# Patient Record
Sex: FEMALE | Race: WHITE | Employment: FULL TIME | URBAN - METROPOLITAN AREA
[De-identification: names, ages, dates, MRNs, and addresses within clinical notes are randomized per-mention and may not be internally consistent; named-entity substitution may affect disease eponyms.]

---

## 2017-05-09 ENCOUNTER — HOSPITAL ENCOUNTER (OUTPATIENT)
Dept: RESPIRATORY THERAPY | Age: 22
Discharge: HOME | End: 2017-05-09
Attending: INTERNAL MEDICINE
Payer: COMMERCIAL

## 2017-05-09 ENCOUNTER — HOSPITAL ENCOUNTER (OUTPATIENT)
Dept: NON INVASIVE DIAGNOSTICS | Age: 22
Discharge: HOME | End: 2017-05-09
Attending: INTERNAL MEDICINE
Payer: COMMERCIAL

## 2017-05-09 DIAGNOSIS — R06.02 SOB (SHORTNESS OF BREATH): ICD-10-CM

## 2017-05-09 PROCEDURE — 94729 DIFFUSING CAPACITY: CPT

## 2017-05-09 PROCEDURE — 94726 PLETHYSMOGRAPHY LUNG VOLUMES: CPT

## 2017-05-09 PROCEDURE — 94060 EVALUATION OF WHEEZING: CPT

## 2017-05-09 PROCEDURE — 93306 TTE W/DOPPLER COMPLETE: CPT

## 2018-07-16 ENCOUNTER — HOSPITAL ENCOUNTER (OUTPATIENT)
Dept: GENERAL RADIOLOGY | Age: 23
Discharge: HOME | End: 2018-07-16
Payer: COMMERCIAL

## 2018-07-16 DIAGNOSIS — M35.89 OTHER SPECIFIED SYSTEMIC INVOLVEMENT OF CONNECTIVE TISSUE (HCC): ICD-10-CM

## 2018-07-16 PROCEDURE — 71046 X-RAY EXAM CHEST 2 VIEWS: CPT

## 2018-12-14 ENCOUNTER — HOSPITAL ENCOUNTER (OUTPATIENT)
Dept: NON INVASIVE DIAGNOSTICS | Age: 23
Discharge: HOME | End: 2018-12-14
Attending: INTERNAL MEDICINE
Payer: COMMERCIAL

## 2018-12-14 ENCOUNTER — HOSPITAL ENCOUNTER (OUTPATIENT)
Dept: RESPIRATORY THERAPY | Age: 23
Discharge: HOME | End: 2018-12-14
Attending: INTERNAL MEDICINE
Payer: COMMERCIAL

## 2018-12-14 VITALS
DIASTOLIC BLOOD PRESSURE: 70 MMHG | BODY MASS INDEX: 21.9 KG/M2 | HEIGHT: 61 IN | SYSTOLIC BLOOD PRESSURE: 112 MMHG | WEIGHT: 116 LBS

## 2018-12-14 DIAGNOSIS — R06.02 SOB (SHORTNESS OF BREATH): ICD-10-CM

## 2018-12-14 LAB
ECHO AO ROOT DIAM: 2.82 CM
ECHO LA AREA 4C: 8.5 CM2
ECHO LA VOL 2C: 20.12 ML (ref 22–52)
ECHO LA VOL 4C: 12.43 ML (ref 22–52)
ECHO LA VOL BP: 18.29 ML (ref 22–52)
ECHO LA VOL/BSA BIPLANE: 12.2 ML/M2 (ref 16–28)
ECHO LA VOLUME INDEX A2C: 13.43 ML/M2 (ref 16–28)
ECHO LA VOLUME INDEX A4C: 8.29 ML/M2 (ref 16–28)
ECHO LV INTERNAL DIMENSION DIASTOLIC: 3.81 CM (ref 3.9–5.3)
ECHO LV INTERNAL DIMENSION SYSTOLIC: 2.58 CM
ECHO LV IVSD: 0.61 CM (ref 0.6–0.9)
ECHO LV MASS 2D: 75.2 G (ref 67–162)
ECHO LV MASS INDEX 2D: 50.2 G/M2 (ref 43–95)
ECHO LV POSTERIOR WALL DIASTOLIC: 0.79 CM (ref 0.6–0.9)
ECHO LVOT DIAM: 2.01 CM
ECHO LVOT PEAK GRADIENT: 2.5 MMHG
ECHO LVOT PEAK VELOCITY: 79.57 CM/S
ECHO LVOT VTI: 15.49 CM
ECHO MV A VELOCITY: 45.74 CM/S
ECHO MV E DECELERATION TIME (DT): 148.6 MS
ECHO MV E VELOCITY: 0.78 CM/S
ECHO MV E/A RATIO: 0.02
ECHO PULMONARY ARTERY SYSTOLIC PRESSURE (PASP): 19 MMHG
ECHO TV REGURGITANT MAX VELOCITY: 201.35 CM/S
ECHO TV REGURGITANT PEAK GRADIENT: 16.2 MMHG

## 2018-12-14 PROCEDURE — 94729 DIFFUSING CAPACITY: CPT

## 2018-12-14 PROCEDURE — 94726 PLETHYSMOGRAPHY LUNG VOLUMES: CPT

## 2018-12-14 PROCEDURE — 94060 EVALUATION OF WHEEZING: CPT

## 2018-12-14 PROCEDURE — 93306 TTE W/DOPPLER COMPLETE: CPT

## 2019-01-04 ENCOUNTER — HOSPITAL ENCOUNTER (OUTPATIENT)
Dept: GENERAL RADIOLOGY | Age: 24
Discharge: HOME | End: 2019-01-04
Payer: COMMERCIAL

## 2019-01-04 DIAGNOSIS — R06.09 OTHER FORMS OF DYSPNEA: ICD-10-CM

## 2019-01-04 PROCEDURE — 71046 X-RAY EXAM CHEST 2 VIEWS: CPT

## 2020-07-16 ENCOUNTER — HOSPITAL ENCOUNTER (OUTPATIENT)
Dept: NON INVASIVE DIAGNOSTICS | Age: 25
Discharge: HOME | End: 2020-07-16
Attending: INTERNAL MEDICINE
Payer: COMMERCIAL

## 2020-07-16 VITALS
HEIGHT: 61 IN | WEIGHT: 116 LBS | BODY MASS INDEX: 21.9 KG/M2 | DIASTOLIC BLOOD PRESSURE: 70 MMHG | SYSTOLIC BLOOD PRESSURE: 98 MMHG

## 2020-07-16 DIAGNOSIS — M35.89 OTHER SPECIFIED SYSTEMIC INVOLVEMENT OF CONNECTIVE TISSUE (HCC): ICD-10-CM

## 2020-07-16 LAB
ECHO AO ROOT DIAM: 3.07 CM
ECHO LV E' LATERAL VELOCITY: 12.55 CM/S
ECHO LV E' SEPTAL VELOCITY: 10.22 CM/S
ECHO LV INTERNAL DIMENSION DIASTOLIC: 3.94 CM (ref 3.9–5.3)
ECHO LV INTERNAL DIMENSION SYSTOLIC: 2.2 CM
ECHO LV IVSD: 0.66 CM (ref 0.6–0.9)
ECHO LV MASS 2D: 82.9 G (ref 67–162)
ECHO LV MASS INDEX 2D: 55.3 G/M2 (ref 43–95)
ECHO LV POSTERIOR WALL DIASTOLIC: 0.83 CM (ref 0.6–0.9)
ECHO LVOT CARDIAC OUTPUT: 3.17 LITER/MINUTE
ECHO LVOT DIAM: 1.99 CM
ECHO LVOT PEAK GRADIENT: 2.15 MMHG
ECHO LVOT PEAK VELOCITY: 73.3 CM/S
ECHO LVOT SV: 42.8 ML
ECHO LVOT VTI: 13.78 CM
ECHO MV A VELOCITY: 58.66 CM/S
ECHO MV E DECELERATION TIME (DT): 0.13 S
ECHO MV E VELOCITY: 80.36 CM/S
ECHO MV E/A RATIO: 1.37
ECHO MV E/E' LATERAL: 6.4
ECHO MV E/E' RATIO (AVERAGED): 7.13
ECHO MV E/E' SEPTAL: 7.86
ECHO RV TAPSE: 1.31 CM (ref 1.5–2)
ECHO TV REGURGITANT MAX VELOCITY: 228.39 CM/S
ECHO TV REGURGITANT PEAK GRADIENT: 20.86 MMHG
LVOT MG: 1.05 MMHG

## 2020-07-16 PROCEDURE — 93306 TTE W/DOPPLER COMPLETE: CPT

## 2021-03-08 ENCOUNTER — TRANSCRIBE ORDER (OUTPATIENT)
Dept: SCHEDULING | Age: 26
End: 2021-03-08

## 2021-03-08 DIAGNOSIS — R06.09 DOE (DYSPNEA ON EXERTION): Primary | ICD-10-CM

## 2021-04-14 ENCOUNTER — HOSPITAL ENCOUNTER (INPATIENT)
Age: 26
LOS: 5 days | Discharge: HOME | DRG: 885 | End: 2021-04-19
Attending: EMERGENCY MEDICINE | Admitting: PSYCHIATRY & NEUROLOGY
Payer: COMMERCIAL

## 2021-04-14 DIAGNOSIS — R45.851 SUICIDAL IDEATION: Primary | ICD-10-CM

## 2021-04-14 PROBLEM — F32.A DEPRESSION: Status: ACTIVE | Noted: 2021-04-14

## 2021-04-14 LAB
AMPHET UR QL SCN: NEGATIVE
ANION GAP SERPL CALC-SCNC: 6 MMOL/L (ref 3–18)
BARBITURATES UR QL SCN: NEGATIVE
BASOPHILS # BLD: 0.1 K/UL (ref 0–0.1)
BASOPHILS NFR BLD: 1 % (ref 0–2)
BENZODIAZ UR QL: POSITIVE
BUN SERPL-MCNC: 10 MG/DL (ref 7–18)
BUN/CREAT SERPL: 14 (ref 12–20)
CALCIUM SERPL-MCNC: 9.8 MG/DL (ref 8.5–10.1)
CANNABINOIDS UR QL SCN: POSITIVE
CHLORIDE SERPL-SCNC: 105 MMOL/L (ref 100–111)
CO2 SERPL-SCNC: 25 MMOL/L (ref 21–32)
COCAINE UR QL SCN: NEGATIVE
COVID-19 RAPID TEST, COVR: NOT DETECTED
CREAT SERPL-MCNC: 0.72 MG/DL (ref 0.6–1.3)
DIFFERENTIAL METHOD BLD: ABNORMAL
EOSINOPHIL # BLD: 0.1 K/UL (ref 0–0.4)
EOSINOPHIL NFR BLD: 1 % (ref 0–5)
ERYTHROCYTE [DISTWIDTH] IN BLOOD BY AUTOMATED COUNT: 12.1 % (ref 11.6–14.5)
ETHANOL SERPL-MCNC: <3 MG/DL (ref 0–3)
GLUCOSE SERPL-MCNC: 84 MG/DL (ref 74–99)
HCG SERPL QL: NEGATIVE
HCT VFR BLD AUTO: 43.4 % (ref 35–45)
HDSCOM,HDSCOM: ABNORMAL
HGB BLD-MCNC: 14.6 G/DL (ref 12–16)
LYMPHOCYTES # BLD: 1.9 K/UL (ref 0.9–3.6)
LYMPHOCYTES NFR BLD: 18 % (ref 21–52)
MCH RBC QN AUTO: 31.8 PG (ref 24–34)
MCHC RBC AUTO-ENTMCNC: 33.6 G/DL (ref 31–37)
MCV RBC AUTO: 94.6 FL (ref 74–97)
METHADONE UR QL: NEGATIVE
MONOCYTES # BLD: 0.6 K/UL (ref 0.05–1.2)
MONOCYTES NFR BLD: 6 % (ref 3–10)
NEUTS SEG # BLD: 7.8 K/UL (ref 1.8–8)
NEUTS SEG NFR BLD: 74 % (ref 40–73)
OPIATES UR QL: NEGATIVE
PCP UR QL: NEGATIVE
PLATELET # BLD AUTO: 342 K/UL (ref 135–420)
PMV BLD AUTO: 9.8 FL (ref 9.2–11.8)
POTASSIUM SERPL-SCNC: 3.7 MMOL/L (ref 3.5–5.5)
RBC # BLD AUTO: 4.59 M/UL (ref 4.2–5.3)
SODIUM SERPL-SCNC: 136 MMOL/L (ref 136–145)
SOURCE, COVRS: NORMAL
WBC # BLD AUTO: 10.6 K/UL (ref 4.6–13.2)

## 2021-04-14 PROCEDURE — 87635 SARS-COV-2 COVID-19 AMP PRB: CPT

## 2021-04-14 PROCEDURE — 82077 ASSAY SPEC XCP UR&BREATH IA: CPT

## 2021-04-14 PROCEDURE — 99282 EMERGENCY DEPT VISIT SF MDM: CPT

## 2021-04-14 PROCEDURE — 84703 CHORIONIC GONADOTROPIN ASSAY: CPT

## 2021-04-14 PROCEDURE — 65220000003 HC RM SEMIPRIVATE PSYCH

## 2021-04-14 PROCEDURE — 85025 COMPLETE CBC W/AUTO DIFF WBC: CPT

## 2021-04-14 PROCEDURE — 80307 DRUG TEST PRSMV CHEM ANLYZR: CPT

## 2021-04-14 PROCEDURE — 80048 BASIC METABOLIC PNL TOTAL CA: CPT

## 2021-04-14 RX ORDER — BUSPIRONE HYDROCHLORIDE 5 MG/1
TABLET ORAL
Status: CANCELLED | OUTPATIENT
Start: 2021-04-14

## 2021-04-14 NOTE — ED PROVIDER NOTES
EMERGENCY DEPARTMENT HISTORY AND PHYSICAL EXAM    5:39 PM      Date: 4/14/2021  Patient Name: Myron Pittman    History of Presenting Illness     Chief Complaint   Patient presents with    Mental Health Problem     SI with plan       History Provided By: Patient    Additional History (Context): Myron Pittman is a 31 y.o. female with hx of anxiety, depression, fatigue, and other noted PMH who presents with complaint of suicidal ideation x1 day. Patient notes her plan would be to take all of her medication. Patient notes she saw her therapist today who recommended she come to the ED for further assessment. Patient denies visual or auditory hallucinations, homicidal ideation. Denies alcohol or drug use. PCP: Douglas Villalobos MD    Current Outpatient Medications   Medication Sig Dispense Refill    doxycycline (VIBRAMYCIN) 100 mg capsule Take 1 Cap by mouth two (2) times a day. 14 Cap 0    thyroid, Pork, (ARMOUR THYROID) 90 mg tablet Take 90 mg by mouth daily.  escitalopram oxalate (LEXAPRO) 20 mg tablet       predniSONE (DELTASONE) 5 mg tablet Take  by mouth.  hydroxychloroquine (PLAQUENIL) 200 mg tablet Take 1 Tab by mouth.  0    ranitidine (ZANTAC) 300 mg tablet Take 1 Tab by mouth as needed.  Indications: Heartburn  1       Past History     Past Medical History:  Past Medical History:   Diagnosis Date    Anxiety     nos    Chronic pain     Coagulation disorder (HCC)     MTHFR mutation - inc clotting    Eosinophilic esophagitis     Fatigue     GERD     Hemangioma     removed as child    Hypothyroid     Lupus ([address]     Raynaud's disease     Restless leg syndrome     Seizures ([address]     one event with blood draw    Unspecified diffuse connective tissue disease     scleroderma variant       Past Surgical History:  Past Surgical History:   Procedure Laterality Date    HX OTHER SURGICAL      hemangioma removal back of neck    HX WISDOM TEETH EXTRACTION         Family History:  Family History   Problem Relation Age of Onset    Diabetes Mother        Social History:  Social History     Tobacco Use    Smoking status: Never Smoker    Smokeless tobacco: Never Used   Substance Use Topics    Alcohol use: Yes     Comment: occasionally    Drug use: No       Allergies: Allergies   Allergen Reactions    Nuts Damian Nicki Nut] Anaphylaxis    Egg Other (comments)     \"throat swelling\"         Review of Systems       Review of Systems   Constitutional: Negative for chills and fever. Respiratory: Negative for shortness of breath. Cardiovascular: Negative for chest pain. Gastrointestinal: Negative for abdominal pain, nausea and vomiting. Skin: Negative for rash. Neurological: Negative for weakness. Psychiatric/Behavioral: Positive for suicidal ideas. All other systems reviewed and are negative. Physical Exam     Visit Vitals  /86 (BP 1 Location: Left upper arm, BP Patient Position: At rest;Sitting)   Pulse 90   Temp 99.3 °F (37.4 °C)   Resp 18   Ht 5' 1\" (1.549 m)   Wt 52.9 kg (116 lb 9.6 oz)   SpO2 99%   BMI 22.03 kg/m²         Physical Exam  Vitals signs and nursing note reviewed. Constitutional:       General: She is not in acute distress. Appearance: She is well-developed. She is not ill-appearing, toxic-appearing or diaphoretic. HENT:      Head: Normocephalic and atraumatic. Neck:      Musculoskeletal: Normal range of motion and neck supple. Cardiovascular:      Rate and Rhythm: Normal rate and regular rhythm. Heart sounds: Normal heart sounds. No murmur. No friction rub. No gallop. Pulmonary:      Effort: Pulmonary effort is normal. No respiratory distress. Breath sounds: Normal breath sounds. No wheezing or rales. Musculoskeletal: Normal range of motion. Skin:     General: Skin is warm. Findings: No rash. Neurological:      General: No focal deficit present. Mental Status: She is alert and oriented to person, place, and time. Psychiatric:         Attention and Perception: Attention normal.         Mood and Affect: Mood normal.         Speech: Speech normal.         Behavior: Behavior normal.         Thought Content: Thought content includes suicidal ideation. Thought content does not include homicidal ideation. Thought content includes suicidal plan. Thought content does not include homicidal plan. Diagnostic Study Results     Labs -  Recent Results (from the past 12 hour(s))   DRUG SCREEN, URINE    Collection Time: 04/14/21  5:28 PM   Result Value Ref Range    BENZODIAZEPINES Positive (A) NEG      BARBITURATES Negative NEG      THC (TH-CANNABINOL) Positive (A) NEG      OPIATES Negative NEG      PCP(PHENCYCLIDINE) Negative NEG      COCAINE Negative NEG      AMPHETAMINES Negative NEG      METHADONE Negative NEG      HDSCOM (NOTE)    ETHYL ALCOHOL    Collection Time: 04/14/21  5:29 PM   Result Value Ref Range    ALCOHOL(ETHYL),SERUM <3 0 - 3 MG/DL   CBC WITH AUTOMATED DIFF    Collection Time: 04/14/21  5:29 PM   Result Value Ref Range    WBC 10.6 4.6 - 13.2 K/uL    RBC 4.59 4.20 - 5.30 M/uL    HGB 14.6 12.0 - 16.0 g/dL    HCT 43.4 35.0 - 45.0 %    MCV 94.6 74.0 - 97.0 FL    MCH 31.8 24.0 - 34.0 PG    MCHC 33.6 31.0 - 37.0 g/dL    RDW 12.1 11.6 - 14.5 %    PLATELET 062 893 - 451 K/uL    MPV 9.8 9.2 - 11.8 FL    NEUTROPHILS 74 (H) 40 - 73 %    LYMPHOCYTES 18 (L) 21 - 52 %    MONOCYTES 6 3 - 10 %    EOSINOPHILS 1 0 - 5 %    BASOPHILS 1 0 - 2 %    ABS. NEUTROPHILS 7.8 1.8 - 8.0 K/UL    ABS. LYMPHOCYTES 1.9 0.9 - 3.6 K/UL    ABS. MONOCYTES 0.6 0.05 - 1.2 K/UL    ABS. EOSINOPHILS 0.1 0.0 - 0.4 K/UL    ABS.  BASOPHILS 0.1 0.0 - 0.1 K/UL    DF AUTOMATED     METABOLIC PANEL, BASIC    Collection Time: 04/14/21  5:29 PM   Result Value Ref Range    Sodium 136 136 - 145 mmol/L    Potassium 3.7 3.5 - 5.5 mmol/L    Chloride 105 100 - 111 mmol/L    CO2 25 21 - 32 mmol/L    Anion gap 6 3.0 - 18 mmol/L    Glucose 84 74 - 99 mg/dL    BUN 10 7.0 - 18 MG/DL    Creatinine 0.72 0.6 - 1.3 MG/DL    BUN/Creatinine ratio 14 12 - 20      GFR est AA >60 >60 ml/min/1.73m2    GFR est non-AA >60 >60 ml/min/1.73m2    Calcium 9.8 8.5 - 10.1 MG/DL   HCG QL SERUM    Collection Time: 04/14/21  5:29 PM   Result Value Ref Range    HCG, Ql. Negative NEG     COVID-19 RAPID TEST    Collection Time: 04/14/21  6:38 PM   Result Value Ref Range    Specimen source Nasopharyngeal      COVID-19 rapid test Not detected NOTD         Radiologic Studies -   No orders to display         Medical Decision Making   I am the first provider for this patient. I reviewed the vital signs, available nursing notes, past medical history, past surgical history, family history and social history. Vital Signs-Reviewed the patient's vital signs. Records Reviewed: Nursing Notes and Old Medical Records (Time of Review: 5:39 PM)    ED Course: Progress Notes, Reevaluation, and Consults:  7:22 PM: Spoke with Deepak Rhoades crisis. Will be down to see patient. Updated patient and parents with plan.   9:09 PM: Spoke with Deepak Rhoades, pt will be admitted to [address] [place] [address]  MDM: 79-year-old female who presents to the ED with suicidal ideation with a plan. Medically screened in the ED, consulted crisis. Patient will be admitted to [address] [place] [address]  behavioral health for further assessment. Diagnosis     Clinical Impression:   1. Suicidal ideation        Disposition:ADMISSION    Follow-up Information    None          Patient's Medications   Start Taking    No medications on file   Continue Taking    DOXYCYCLINE (VIBRAMYCIN) 100 MG CAPSULE    Take 1 Cap by mouth two (2) times a day. ESCITALOPRAM OXALATE (LEXAPRO) 20 MG TABLET        HYDROXYCHLOROQUINE (PLAQUENIL) 200 MG TABLET    Take 1 Tab by mouth. PREDNISONE (DELTASONE) 5 MG TABLET    Take  by mouth. RANITIDINE (ZANTAC) 300 MG TABLET    Take 1 Tab by mouth as needed. Indications: Heartburn    THYROID, PORK, (ARMOUR THYROID) 90 MG TABLET    Take 90 mg by mouth daily. These Medications have changed    No medications on file   Stop Taking    No medications on file       Dictation disclaimer:  Please note that this dictation was completed with [employer], the computer voice recognition software. Quite often unanticipated grammatical, syntax, homophones, and other interpretive errors are inadvertently transcribed by the computer software. Please disregard these errors. Please excuse any errors that have escaped final proofreading.

## 2021-04-15 PROBLEM — E03.9 ACQUIRED HYPOTHYROIDISM: Chronic | Status: ACTIVE | Noted: 2021-04-15

## 2021-04-15 PROBLEM — M32.9 LUPUS (SYSTEMIC LUPUS ERYTHEMATOSUS) (HCC): Status: ACTIVE | Noted: 2021-04-15

## 2021-04-15 PROBLEM — F31.4 BIPOLAR 1 DISORDER, DEPRESSED, SEVERE (HCC): Status: ACTIVE | Noted: 2021-04-14

## 2021-04-15 PROBLEM — F12.20 CANNABIS USE DISORDER, SEVERE, DEPENDENCE (HCC): Status: ACTIVE | Noted: 2021-04-15

## 2021-04-15 PROCEDURE — 74011250637 HC RX REV CODE- 250/637: Performed by: PSYCHIATRY & NEUROLOGY

## 2021-04-15 PROCEDURE — 99223 1ST HOSP IP/OBS HIGH 75: CPT | Performed by: PSYCHIATRY & NEUROLOGY

## 2021-04-15 PROCEDURE — 65220000003 HC RM SEMIPRIVATE PSYCH

## 2021-04-15 RX ORDER — LAMOTRIGINE 25 MG/1
50 TABLET ORAL 2 TIMES DAILY
Status: DISCONTINUED | OUTPATIENT
Start: 2021-04-15 | End: 2021-04-19 | Stop reason: HOSPADM

## 2021-04-15 RX ORDER — EPINEPHRINE 0.3 MG/.3ML
0.3 INJECTION SUBCUTANEOUS
Status: DISPENSED | OUTPATIENT
Start: 2021-04-15 | End: 2021-04-16

## 2021-04-15 RX ORDER — LEVOTHYROXINE AND LIOTHYRONINE 19; 4.5 UG/1; UG/1
120 TABLET ORAL DAILY
Status: DISCONTINUED | OUTPATIENT
Start: 2021-04-16 | End: 2021-04-19 | Stop reason: HOSPADM

## 2021-04-15 RX ORDER — ALPRAZOLAM 0.5 MG/1
0.5 TABLET ORAL
COMMUNITY

## 2021-04-15 RX ORDER — BUPROPION HYDROCHLORIDE 300 MG/1
300 TABLET ORAL
Status: DISCONTINUED | OUTPATIENT
Start: 2021-04-15 | End: 2021-04-19 | Stop reason: HOSPADM

## 2021-04-15 RX ORDER — ALPRAZOLAM 0.25 MG/1
0.5 TABLET ORAL
Status: DISCONTINUED | OUTPATIENT
Start: 2021-04-15 | End: 2021-04-19 | Stop reason: HOSPADM

## 2021-04-15 RX ORDER — DIPHENHYDRAMINE HCL 25 MG
25 CAPSULE ORAL
Status: DISCONTINUED | OUTPATIENT
Start: 2021-04-15 | End: 2021-04-19 | Stop reason: HOSPADM

## 2021-04-15 RX ORDER — LAMOTRIGINE 25 MG/1
50 TABLET ORAL DAILY
Status: DISCONTINUED | OUTPATIENT
Start: 2021-04-15 | End: 2021-04-15

## 2021-04-15 RX ORDER — BUPROPION HYDROCHLORIDE 300 MG/1
300 TABLET ORAL
COMMUNITY

## 2021-04-15 RX ORDER — HYDROXYCHLOROQUINE SULFATE 200 MG/1
200 TABLET, FILM COATED ORAL
Status: DISCONTINUED | OUTPATIENT
Start: 2021-04-15 | End: 2021-04-15 | Stop reason: ALTCHOICE

## 2021-04-15 RX ORDER — HYDROXYCHLOROQUINE SULFATE 200 MG/1
200 TABLET, FILM COATED ORAL DAILY
Status: DISCONTINUED | OUTPATIENT
Start: 2021-04-16 | End: 2021-04-19 | Stop reason: HOSPADM

## 2021-04-15 RX ORDER — HYDROXYZINE PAMOATE 50 MG/1
50 CAPSULE ORAL
Status: DISCONTINUED | OUTPATIENT
Start: 2021-04-15 | End: 2021-04-19 | Stop reason: HOSPADM

## 2021-04-15 RX ORDER — TRAZODONE HYDROCHLORIDE 50 MG/1
50 TABLET ORAL
Status: DISCONTINUED | OUTPATIENT
Start: 2021-04-15 | End: 2021-04-15

## 2021-04-15 RX ORDER — LAMOTRIGINE 25 MG/1
50 TABLET ORAL DAILY
Status: ON HOLD | COMMUNITY
End: 2021-04-19 | Stop reason: SDUPTHER

## 2021-04-15 RX ADMIN — ALPRAZOLAM 0.5 MG: 0.25 TABLET ORAL at 20:38

## 2021-04-15 RX ADMIN — LAMOTRIGINE 50 MG: 25 TABLET ORAL at 20:38

## 2021-04-15 RX ADMIN — BUPROPION HYDROCHLORIDE 300 MG: 300 TABLET, FILM COATED, EXTENDED RELEASE ORAL at 08:46

## 2021-04-15 RX ADMIN — LAMOTRIGINE 50 MG: 25 TABLET ORAL at 08:44

## 2021-04-15 RX ADMIN — DIPHENHYDRAMINE HYDROCHLORIDE 25 MG: 25 CAPSULE ORAL at 13:44

## 2021-04-15 NOTE — BH NOTES
Patient was lying in bed awake upon Staff arrival on Unit this morning. Patient ate about 50% of her Breakfast and Lunch during shift today. Patient did not interact with Peers, but Patient did cooperate and interact well with Staff today during shift. Patient Attended and Participated in Group Sessions today during shift. Staff assisted Patient with her Menu choices for tomorrow's Meals. Patient requested to have a consultation with Dietician. Staff notified Dietician of Patient's request for consultation. Dietician did consult with Patient as requested during shift today. Patient did not exhibit any negative, defiant, aggressive behavior today during shift. Staff will continue to monitor Patient for safety, behavior and location.
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
## 2021-04-15 NOTE — BSMART NOTE
Comprehensive Assessment Integrated Summary Patient is a 31year old female who presented to the emergency room with c/o \"depressed and having suicidal thoughts with a plan to overdose on my medications. \" Patient verbalized she has racing thoughts, increased anxiety, difficulty sleeping, increased depressed and having nightmares. Patient stated that she had a nightmare last night, but can't remember what the nightmare was about. Patient denied thoughts of harm towards others. Patient also stated that she has been seen by a therapist, since she was in high school, and she is now seen by Marcell Plascencia and Miesha Casey, [address] [place] [address]. Patient said that last appointments were 4/13/21, and 4/14/21. Patient stated that Ms. Susannah Paul sent her to the ED for mental health evaluation on today. Patient added that she has a medical history of Eosinophilic esophagitis and she uses Magic Mouthwash and Prilosec for the symptoms associated with the above disorder. Mental Status Exam 
 
The patient's appearance shows no evidence of impairment. The patient's behavior calm, cooperative, pleasant. The patient is oriented to time, place, person and situation. The patient's speech is soft. The patient's mood is depressed and is anxious. The range of affect shows no evidence of impairment. The patient's thought content demonstrates no evidence of impairment. The thought process shows no evidence of impairment. The patient's perception shows no evidence of impairment. The patient's memory shows no evidence of impairment. DME: None Housing: \"Live with mom\" Legal Issues: Denied Access to weapons: Denied Substance Abuse: \"sometimes, smoke marijuana\" Outpatient Care: Miesha Pisano, [facility]\" Inpatient Services: None Contact/Support Person: Suki De La Paz, mother, [phone]\" Disposition Patient is receptive to voluntary admission at [facility]; discussed with on-call psychiatrist, admission orders received, and report called to charge nurse.  
 
Sahara Cifuentes, RN, BSN

## 2021-04-15 NOTE — BSMART NOTE
[address] [facility] Biopsychosocial Assessment Current Level of Psychosocial Functioning  
 
[x]Independent 
[]Dependent []Minimal Assist 
 
 
Comments:   
 
Psychosocial High Risk Factors (check all that apply) []Unable to obtain meds []Chronic illness/pain []Substance abuse  
[]Lack of Family Support []Financial stress []Isolation []Inadequate [employer] [x]Suicide attempt(s) []Not taking medications []Victim of crime []Developmental Delay 
[]Unable to manage personal needs []Age 72 or older  
[]  Homeless []Shari transportation []Readmission within 30 days []Unemployment []Traumatic Event Psychiatric Advanced Directive:  None Family to involve in treatment: Yes Sexual Orientation: Heterosexual 
  
Patient Strengths: Pt. is willing to seek treatment Patient Barriers:  Pt. Has poor coping skills, experiencing loss of a relationship and poor impuls control Substance Abuse: Yes. Marijuana on occasions. Pt. was Safety plan: STEPHANIE discussed safety plan. Pt contracts for safety CMHC/ history: Please refer to the psychiatrist and NP or PA note Bipolar Disorder, and Cannabis Use 
  
  
Plan of Care: STEPHANIE discussed and encouraged pt. to participate in the following activities: medication management, group/individual therapies, family meetings, psycho education, treatment team meetings to assist with stabilization Initial Discharge Plan:3- 5 days Clinical Summary:Pt. is a 53-year-old female with history of Bipolar Disorder. Pt. was admitted to this facility for increase ideations to harm self with a plan to overdoes on medications. STEPHANIE met with pt. to discuss this admission. I have been stressed over work and a recent relationship issues and past relationship losses.   I have been experiencing increase ideations to hurt myself with a plan to take pills. I have been experiencing racing thoughts, could not sleep and have been experiencing nightmares. 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 Pt expressed being bullied by her . Pt. Expressed she is los responsible for both in classroom learning and virtual learning. Pt. admits she has been experiencing depression since her high school years. Pt. has been in outpatient mental health counseling since that time. Pt is currently followed by [facility]. Pt. states she shared with her therapist, that she feels manic with increase thoughts to harm self. Pt was advised to admit self into the hospital. SW discussed self-efficacy, safety plan and utilizing positive coping skills such as positive self-talk. SW provide pt. with support and positive feedback. Pt appears depressed, anxious and has fair insight. Pt. contracts for safety. SW will assist pt. with dc planning. Pt. states at NM she plans to return to her home with her parents. Pt. will continue to follow up with [facility].   
 
 
 
 
 
Dmitri Valdez MA, MARINA-R

## 2021-04-15 NOTE — BH NOTES
Admission Note : [facility] MICHAEL AR: Received from LICHA HERMOSILLO. Pt has never been in patient for psychiatric care, however she has been involved in out patient treatment with [facility]. She reports that her mothers brother committed suicide. Client has had suicidal ideation and did not want to discuss this with her mother because she feared it would hurt her mother. 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 She had a relationship that ended in December and has had fleeting suicidal thoughts since that time. She lives with her parents and feels they are supportive. She has medical concerns that contributes to her anxiety and depression. Pt has Lupus , Raynaud's Disease, ? Seizures,  She has had thoughts to overdose on medication.

## 2021-04-15 NOTE — BH NOTES
Patient signed release of information for Doctor, , and Staff to speak with the following:     Mother, Lane Earing [phone] & [phone]  Father, Elzia Edge [phone] [phone]  Therapy with Osvaldo Valenzuela  Medication with Elen Zee

## 2021-04-15 NOTE — BSMART NOTE
SOCIAL WORK GROUP THERAPY PROGRESS NOTE Group Time:  10:30am 
 
Group Topic:  Coping Skills    C D Issues Group Participation:   
 
Pt moderately involved during group discussion but remained attentive. Affect flat, mood dysphoric. Nodded yes a lot when symptoms of depression & anxiety were mentioned. \"Seven Steps\" for taking responsibility for our Happiness was reviewed including commitment to change, self-care, setting limits, goal setting & letting go. Reviewed strategies to keep a \"Journal\" for moods, cognitions, behavior & outcome. Admits needs to define goals more specifically.

## 2021-04-15 NOTE — BH NOTES
Pt arrived at approximately 0329. She appeared to have slept for 1.50 hours thus far. The downtime documentation for this period (2465-3997) is being entered ,following the guidelines, as defined in the [facility] downtime policy. Pt downtime sheets are in Pt chart. Will continue to monitor for safety.

## 2021-04-15 NOTE — BSMART NOTE
ART THERAPY GROUP PROGRESS NOTE Group time: 950 The patient was unavailable for group and met with her doctor. She did participate in group discussion at the beginning of group prior to meeting with doctor. Art Therapy Intern administered group art therapy

## 2021-04-15 NOTE — ED NOTES
TRANSFER - OUT REPORT:    Verbal report given to [facility]) on Viona Mac  being transferred to behavioural(unit) for routine progression of care       Report consisted of patients Situation, Background, Assessment and   Recommendations(SBAR). Information from the following report(s) SBAR, Kardex, ED Summary, Intake/Output, MAR, Accordion and Recent Results was reviewed with the receiving nurse. Lines:       Opportunity for questions and clarification was provided.       Patient transported with:   Registered Nurse

## 2021-04-15 NOTE — CONSULTS
Comprehensive Nutrition Assessment    Type and Reason for Visit: Initial, Consult    Nutrition Recommendations/Plan:  - Continue regular diet. Egg and tree nut allergy listed in pt's chart    Nutrition Assessment:  Pt reports normal appetite and po intake of recent meals. Asking to speak with this writer to clarify pt allergies. Egg and tree nut allergy listed in pt's chart. Pt reporting other sensitivites to foods but unable to provide specific foods other than \"some types of oranges. \"    Malnutrition Assessment:  Malnutrition Status:  No malnutrition      Nutrition History and Allergies: PMHx- anxiety, depression, lupus, seizures, fatigue & eosinophilic esophagitis. Presented with c/o SI x day with plan to take all of her medication. Wt hx stable per chart with -118#. Allergy to egg and tree nut. Estimated Daily Nutrient Needs:  Energy (kcal): 2489-9149; Weight Used for Energy Requirements: Current(52 kg)  Protein (g): 42-52; Weight Used for Protein Requirements: Current(0.8-1)  Fluid (ml/day): 4411-1720; Method Used for Fluid Requirements: 1 ml/kcal    Nutrition Related Findings:  Denies N/V or constipation/diarrhea      Wounds:    None       Current Nutrition Therapies:  DIET REGULAR    Anthropometric Measures:  · Height:  5' 1\" (154.9 cm)  · Current Body Wt:  52.2 kg (115 lb)   · Admission Body Wt:  115 lb    · Usual Body Wt:  53.5 kg (118 lb)     · Ideal Body Wt:  105 lbs:  109.5 %   · BMI Category:  Normal weight (BMI 18.5-24. 9)       Nutrition Diagnosis:   No nutrition diagnosis at this time    Nutrition Interventions:   Food and/or Nutrient Delivery: Continue current diet  Nutrition Education and Counseling: Education not indicated  Coordination of Nutrition Care: Continue to monitor while inpatient    Goals:  PO nutrition intake will continue to meet >75% of patients estimated nutritional needs over the next 7 days.        Nutrition Monitoring and Evaluation:   Behavioral-Environmental Outcomes: None identified  Food/Nutrient Intake Outcomes: Food and nutrient intake  Physical Signs/Symptoms Outcomes: Meal time behavior, Nutrition focused physical findings    Discharge Planning:    No discharge needs at this time     Electronically signed by Herlinda Boone RD on 4/15/2021 at 12:50 PM    Contact: [phone]

## 2021-04-15 NOTE — BSMART NOTE
OCCUPATIONAL THERAPY PROGRESS NOTE Group Time:  8037 Attendance: The patient attended full group. Participation: The patient participated with moderate elaboration in the activity. Attention: The patient was able to focus on the activity. Interaction: The patient acknowledges others or responds to questions,  with no spontaneous interaction. Able to ID some positive self talk she can use.

## 2021-04-15 NOTE — H&P
Psychiatry History and Physical    Subjective:     Date of Evaluation:  4/15/2021    Reason for Referral:  Vipin Gonzalez was referred to the examiners from  ED for SI. History of Presenting Problem: Vipin Gonzalez is a 32 yo F with PMH Depression, Anxiety, GERD, Lupus, hypothyroidism who was admitted for SI. She reports she had SI and a plan. She denies HI or hallucinations. Tox screen positive for benzodiazepines and THC, EtOH and Rapid COVID negative. She denies any acute physical complaints today. Patient Active Problem List    Diagnosis Date Noted    Depression 04/14/2021    Tachycardia      Past Medical History:   Diagnosis Date    Anxiety     nos    Chronic pain     Coagulation disorder (HCC)     MTHFR mutation - inc clotting    Eosinophilic esophagitis     Fatigue     GERD     Hemangioma     removed as child    Hypothyroid     Lupus ([facility] [address]     Raynaud's disease     Restless leg syndrome     Seizures ([facility] [address]     one event with blood draw    Unspecified diffuse connective tissue disease     scleroderma variant     Past Surgical History:   Procedure Laterality Date    HX OTHER SURGICAL      hemangioma removal back of neck    HX WISDOM TEETH EXTRACTION         Family History   Problem Relation Age of Onset    Diabetes Mother       Social History     Tobacco Use    Smoking status: Never Smoker    Smokeless tobacco: Never Used   Substance Use Topics    Alcohol use: Yes     Comment: occasionally     Prior to Admission medications    Medication Sig Start Date End Date Taking? Authorizing Provider   lamoTRIgine (LaMICtaL) 25 mg tablet Take 50 mg by mouth daily. Yes Provider, Historical   ALPRAZolam (Xanax) 0.5 mg tablet Take 0.5 mg by mouth nightly as needed for Anxiety (qhs prn and x 1 daily prn). Indications: anxiousness associated with depression   Yes Provider, Historical   buPROPion XL (WELLBUTRIN XL) 300 mg XL tablet Take 300 mg by mouth every morning.    Yes Provider, Historical hydroxychloroquine (PLAQUENIL) 200 mg tablet Take 1 Tab by mouth. 2/10/16  Yes Provider, Historical   doxycycline (VIBRAMYCIN) 100 mg capsule Take 1 Cap by mouth two (2) times a day. 2/8/18   Polo See MD   thyroid, Pork, (ARMOUR THYROID) 90 mg tablet Take 90 mg by mouth daily. Provider, Historical   escitalopram oxalate (LEXAPRO) 20 mg tablet  10/26/17   Provider, Historical   predniSONE (DELTASONE) 5 mg tablet Take  by mouth. Provider, Historical   ranitidine (ZANTAC) 300 mg tablet Take 1 Tab by mouth as needed.  Indications: Heartburn 2/10/16   Provider, Historical     Allergies   Allergen Reactions    Nuts Merian Norse Nut] Anaphylaxis    Egg Other (comments)     \"throat swelling\"        Review of Systems - History obtained from chart review and the patient  General ROS: negative  Psychological ROS: positive for - depression, SI  Ophthalmic ROS: negative  ENT ROS: negative  Respiratory ROS: negative  Cardiovascular ROS: negative  Gastrointestinal ROS: negative  Musculoskeletal ROS: negative  Neurological ROS: negative  Dermatological ROS: negative      Objective:     Patient Vitals for the past 8 hrs:   BP Temp Pulse Resp Height Weight   04/15/21 0845 108/76 98.8 °F (37.1 °C) (!) 112 22     04/15/21 0358 102/70 98.8 °F (37.1 °C) 100 16 5' 1\" (1.549 m) 52.2 kg (115 lb)       Mental Status exam: WNL except for    Sensorium  oriented to time, place and person   Orientation situation   Relations cooperative   Eye Contact appropriate   Appearance:  age appropriate   Motor Behavior:  within normal limits   Speech:  normal pitch and normal volume   Vocabulary average   Thought Process: goal directed   Thought Content free of delusions and free of hallucinations   Suicidal ideations plan and intention   Homicidal ideations no plan  and no intention   Mood:  sad   Affect:  stable   Memory recent  adequate   Memory remote:  adequate   Concentration:  adequate   Abstraction:  concrete   Insight:  fair Reliability good   Judgment:  fair         Physical Exam:   Visit Vitals  /76 (BP 1 Location: Right arm, BP Patient Position: Sitting)   Pulse (!) 112   Temp 98.8 °F (37.1 °C)   Resp 22   Ht 5' 1\" (1.549 m)   Wt 52.2 kg (115 lb)   SpO2 99%   Breastfeeding No   BMI 21.73 kg/m²     General appearance: alert, cooperative, no distress, appears stated age  Head: Normocephalic, without obvious abnormality, atraumatic  Eyes: negative  Ears: normal TM's and external ear canals AU  Nose: Nares normal. Septum midline. Mucosa normal. No drainage or sinus tenderness. Throat: Lips, mucosa, and tongue normal. Teeth and gums normal  Neck: supple, symmetrical, trachea midline and no adenopathy  Lungs: clear to auscultation bilaterally  Heart: tacycardic and rhythm normal, S1, S2 normal, no murmur, click, rub or gallop  Abdomen: soft, non-tender. organomegaly  Extremities: extremities normal, atraumatic, no cyanosis or edema  Skin: Skin color, texture, turgor normal. No rashes or lesions  Neurologic: Grossly normal        Impression:      Active Problems:    Depression (4/14/2021)          Plan:     Recommendations for Treatment/Conditions:  Psychiatric treatment recommended while in hospital  Admit to inpatient psych for depression/SI    Referral To:    Inpatient psychiatric care          [place], [place]   4/15/2021 11:46 AM

## 2021-04-16 PROCEDURE — 74011250637 HC RX REV CODE- 250/637: Performed by: PSYCHIATRY & NEUROLOGY

## 2021-04-16 PROCEDURE — 99231 SBSQ HOSP IP/OBS SF/LOW 25: CPT | Performed by: PSYCHIATRY & NEUROLOGY

## 2021-04-16 PROCEDURE — 65220000003 HC RM SEMIPRIVATE PSYCH

## 2021-04-16 RX ADMIN — HYDROXYCHLOROQUINE SULFATE 200 MG: 200 TABLET ORAL at 09:00

## 2021-04-16 RX ADMIN — LAMOTRIGINE 50 MG: 25 TABLET ORAL at 20:40

## 2021-04-16 RX ADMIN — ALPRAZOLAM 0.5 MG: 0.25 TABLET ORAL at 20:40

## 2021-04-16 RX ADMIN — BUPROPION HYDROCHLORIDE 300 MG: 300 TABLET, FILM COATED, EXTENDED RELEASE ORAL at 06:05

## 2021-04-16 RX ADMIN — LAMOTRIGINE 50 MG: 25 TABLET ORAL at 09:00

## 2021-04-16 RX ADMIN — LEVOTHYROXINE, LIOTHYRONINE 120 MG: 19; 4.5 TABLET ORAL at 09:00

## 2021-04-16 NOTE — BSMART NOTE
ART THERAPY GROUP PROGRESS NOTE PATIENT SCHEDULED FOR GROUP AT: 950 ATTENDANCE: Full PARTICIPATION LEVEL: Participates fully in the art process ATTENTION LEVEL : Able to focus on task FOCUS: Treatment goals SYMBOLIC & THEMATIC CONTENT AS NOTED IN IMAGERY: She was calm and compliant. She was engaged and invested in the task at hand. Her approach was planned out and organized. She participated in group discussion and shared that \"anxiety, co-dependence, and other people's opinions\" tend to hold her back from reaching her goals of \"independence and happiness. \" Art Therapy intern administered group art therapy

## 2021-04-16 NOTE — BSMART NOTE
OCCUPATIONAL THERAPY PROGRESS NOTE Group Time:  6851 Attendance: The patient attended full group. Participation: The patient participated fully in the activity. Attention: The patient was able to focus on the activity. Roxi Kamara Interaction: The patient acknowledges others or responds to questions,  with no spontaneous interaction. Identified and practiced affirmations related to improving self esteem.

## 2021-04-16 NOTE — PROGRESS NOTES
Problem: Falls - Risk of  Goal: *Absence of Falls  Description: Document Prashanth Garrett Fall Risk and appropriate interventions in the flowsheet. Outcome: Progressing Towards Goal  Note: Fall Risk Interventions:     Medication Interventions: Teach patient to arise slowly     Problem: Patient Education: Go to Patient Education Activity  Goal: Patient/Family Education  Outcome: Progressing Towards Goal     Problem: Falls - Risk of  Goal: *Absence of Falls  Description: Document Prashanth Benson Fall Risk and appropriate interventions in the flowsheet. Outcome: Progressing Towards Goal  Note: Fall Risk Interventions:  Medication Interventions: Teach patient to arise slowly    Problem: Suicide  Goal: *STG: Remains safe in hospital  Outcome: Progressing Towards Goal  Goal: *STG: Seeks staff when feelings of self harm or harm towards others arise  Outcome: Progressing Towards Goal  Goal: *STG: Attends activities and groups  Outcome: Progressing Towards Goal  Goal: *STG/LTG: Complies with medication therapy  Outcome: Progressing Towards Goal     Problem: Seizure Disorder (Adult)  Goal: *STG: Remains free of seizure activity  Outcome: Progressing Towards Goal  Goal: *STG/LTG: Complies with medication therapy  Outcome: Progressing Towards Goal  Goal: *STG: Remains free of injury during seizure activity  Outcome: Progressing Towards Goal      The patient is alert and orientated to time, place and situation. She has been up at will on the unit. She has been pleasant and cooperative. She has been compliant with her medication regimen. There has been no injures or incidents this morning. She has attended and participated in group. She denies that there are thoughts of harming herself or others. Will continue to monitor and will continue to provide support.

## 2021-04-16 NOTE — MED STUDENT NOTES
*ATTENTION:  This note has been created by a medical student for educational purposes only. Please do not refer to the content of this note for clinical decision-making, billing, or other purposes. Please see attending physicians note to obtain clinical information on this patient. * Student Progress Note Please refer to attendings daily rounding note for full details Patient: Chuy Rendon MRN: [id number]  CSN: [id number] YOB: 1995  Age: 31 y.o. Sex: female DOA: 4/14/2021 LOS:  LOS: 2 days Subjective:  
Treatment Team Notes: Patient slept 6.5 hours last night. She is eating 50% of her meals. Collateral from [address] [place] [address] psychiatry states she inconsistently attends her outpatient appointments. Patient Interview: Patient states that her mood is better today. She experienced racing thoughts yesterday but her thoughts are clear today. Her appetite is good, and she slept through the night without nightmares. Her energy level is low due to being tired. She had a food sensitivity reaction yesterday to oranges and had to take a Benadryl. Patient has talked to her parents, and she states she feels more comfortable discussing her feelings with them. Coping skills and relaxation techniques were discussed for when she feels anxious and overwhelmed. She denies suicidal ideations. Objective:  
  
Visit Vitals /88 (BP 1 Location: Right upper arm, BP Patient Position: Sitting) Pulse 88 Temp 98.1 °F (36.7 °C) Resp 18 Ht 5' 1\" (1.549 m) Wt 52.2 kg (115 lb) SpO2 99% Breastfeeding No  
BMI 21.73 kg/m² Mental Status Exam: 
Appearance/Hygiene 31 y.o. WHITE female Hygiene: Good Behavior/Social Relatedness Appropriate Musculoskeletal Gait/Station: appropriate Psychomotor (hyperkinetic, hypokinetic): calm Involuntary movements (tics, dyskinesias, akathisa, stereotypies): none Speech                Rate, rhythm, volume, fluency and articulation are appropriate Mood                Depressed Affect                               Labile, euphoric and tearful Thought Process Linear and goal directed 
  Thought Content and Perceptual Disturbances   
Denies suicidal or homicidal ideations  
  
Denies auditory and visual hallucinations Sensorium and Cognition    A&Ox4, attention intact, memory intact, concentration is intact, language use appropriate, and fund of knowledge age appropriate Insight                fair Judgment Fair I have reviewed the patient's Labs and Radiology studies. Assessment:  
 
Patient Active Problem List  
Diagnosis Code  Tachycardia R00.0  Bipolar 1 disorder, depressed, severe ([address] F31.4  Cannabis use disorder, severe, dependence ([facility] [address] F12.20  Lupus (systemic lupus erythematosus) (HCC) M32.9  Acquired hypothyroidism E03.9 Plan: 1. Continue Wellbutrin  mg daily and Lamictal 50 mg BID for depression and mood. 2. Continue Plaquenil 200 mg daily for SLE. 3. Continue [place] 120 mg daily for GERD. 4. Resume home Omeprazole for GERD. 5. Encourage participation in group, art, and occupational therapy. 6. Disposition/Discharge Date: Anticipate discharge to home on Monday. Ayla Carmona 4/16/2021 
12:20 PM 
*ATTENTION:  This note has been created by a medical student for educational purposes only. Please do not refer to the content of this note for clinical decision-making, billing, or other purposes. Please see attending physicians note to obtain clinical information on this patient. *

## 2021-04-16 NOTE — BH NOTES
Patient appears polite and manner-able from what this writer has witnessed. Patient appears to get along well with peers. During this shift patient attended activity, community and nursing groups. Patient spent time today in the milieu, watching TV and socializing. Patient had snacks during snack time. This writer has witnessed no behavioral issues from patient during this shift. This writer will continue to monitor patient for safety.

## 2021-04-16 NOTE — BSMART NOTE
ART THERAPY INDIVIDUAL PROGRESS NOTE PATIENT SCHEDULED FOR INDIVIDUAL AT: 1330 ATTENDANCE: Full PARTICIPATION LEVEL: Participates fully in the art process ATTENTION LEVEL : Able to focus on task FOCUS: Boundaries SYMBOLIC & THEMATIC CONTENT AS NOTED IN IMAGERY: She was calm and compliant. She was engaged and invested in the task at hand. Her approach was very organized and planned out. She shared about her personal boundaries and demonstrated high levels of insight. This writer recommended follow-up with outpatient therapist about grief. She also discussed about how journaling has been helpful. Art Therapy Intern administered individual art therapy

## 2021-04-16 NOTE — PROGRESS NOTES
[address] [facility] [address]  Inpatient Progress Note     Date of Service: 04/16/21  Hospital Day: 2     Subjective/Interval History   04/16/21    Treatment Team Notes:  Notes reviewed and/or discussed and report that Jessica Arriola is a 26-year-old female admitted for suicidal ideation with plan to overdose on her medications. No acute events overnight. Patient slept 6.5 hours. No behavioral problems. Patient interview: Jessica Arriola was interviewed by this writer today. Patient states that her mood is better today. She experienced racing thoughts yesterday but her thoughts are clear today. Her appetite is good, and she slept through the night without nightmares. Her energy level is low due to being tired. She had a food sensitivity reaction yesterday to oranges and had to take a Benadryl. Patient has talked to her parents, and she states she feels more comfortable discussing her feelings with them. Coping skills and relaxation techniques were discussed for when she feels anxious and overwhelmed. She denies suicidal ideations. Supportive therapy was provided and she was able to discuss short-term and long-term goals and her plans for the future. Objective     Visit Vitals  /88 (BP 1 Location: Right upper arm, BP Patient Position: Sitting)   Pulse 88   Temp 98.1 °F (36.7 °C)   Resp 18   Ht 5' 1\" (1.549 m)   Wt 52.2 kg (115 lb)   SpO2 99%   Breastfeeding No   BMI 21.73 kg/m²       No results found for this or any previous visit (from the past 24 hour(s)). Mental Status Examination     Appearance/Hygiene 31 y.o.  WHITE female  Hygiene: Good   Behavior/Social Relatedness Appropriate   Musculoskeletal Gait/Station: appropriate  Tone (flaccid, cogwheeling, spastic): not assessed  Psychomotor (hyperkinetic, hypokinetic): calm   Involuntary movements (tics, dyskinesias, akathisa, stereotypies): none   Speech   Rate, rhythm, volume, fluency and articulation are appropriate   Mood   Depressed, anxious   Affect    sad and tearful   Thought Process Linear and goal directed   Thought Content and Perceptual Disturbances Denies self-injurious behavior (SIB), suicidal ideation (SI), aggressive behavior or homicidal ideation (HI)    Denies auditory and visual hallucinations   Sensorium and Cognition  Grossly intact   Insight  fair   Judgment  fair        Assessment/Plan      Psychiatric Diagnoses:   Patient Active Problem List   Diagnosis Code    Tachycardia R00.0    Bipolar 1 disorder, depressed, severe ([facility] [address] F31.4    Cannabis use disorder, severe, dependence ([facility] [address] F12.20    Lupus (systemic lupus erythematosus) ([facility] [address] M32.9    Acquired hypothyroidism E03.9       Level of impairment/disability: Moderate    Kervin Lundy is a 31 y.o. who is currently admitted for SI and worsening mood who is doing better today. 1.  Continue current medication regimen without changes. 2.  Reviewed instructions, risks, benefits and side effects of medications  3. Disposition/Discharge Date: self-care/home, possible discharge tomorrow.     MD DR. DAXA CardenasS [facility]  Psychiatry

## 2021-04-16 NOTE — BSMART NOTE
Pt. is a 25-year-old female with history of Bipolar Disorder. Pt. was admitted to this facility for increase ideations to harm self with a plan to overdoes on medications. Pt.s case was discussed. SW Contact : SW met with pt. to discuss dc planning. Pt expressed she was  hoping she would be dc today. SW engaged pt with coping strategies . pt was able to reflect on positive thoughts and goals. Pt. expressed she is feeling better and no longer has thoughts to harm self. Pt. states she plans to take medication and follow up with [facility]. Pt has an appointment scheduled for therapy on 5/13/21 @ 12:30 and a medication appointment scheduled for 5/27/21 @ 1:45. Pt. 's mood is improving  with some anxiety, has fair insight, goal oriented and denies ideations and hallucinations. Pt. plans at discharge to return to her home address with parents.   
 
 
Garfield Hickey MA, LMHP-R

## 2021-04-17 PROCEDURE — 99231 SBSQ HOSP IP/OBS SF/LOW 25: CPT | Performed by: PSYCHIATRY & NEUROLOGY

## 2021-04-17 PROCEDURE — 65220000003 HC RM SEMIPRIVATE PSYCH

## 2021-04-17 PROCEDURE — 74011250637 HC RX REV CODE- 250/637: Performed by: PSYCHIATRY & NEUROLOGY

## 2021-04-17 RX ADMIN — LAMOTRIGINE 50 MG: 25 TABLET ORAL at 09:43

## 2021-04-17 RX ADMIN — LAMOTRIGINE 50 MG: 25 TABLET ORAL at 20:03

## 2021-04-17 RX ADMIN — LEVOTHYROXINE, LIOTHYRONINE 120 MG: 19; 4.5 TABLET ORAL at 09:43

## 2021-04-17 RX ADMIN — BUPROPION HYDROCHLORIDE 300 MG: 300 TABLET, FILM COATED, EXTENDED RELEASE ORAL at 06:28

## 2021-04-17 RX ADMIN — HYDROXYCHLOROQUINE SULFATE 200 MG: 200 TABLET ORAL at 09:42

## 2021-04-17 RX ADMIN — ALPRAZOLAM 0.5 MG: 0.25 TABLET ORAL at 20:04

## 2021-04-17 RX ADMIN — DIPHENHYDRAMINE HYDROCHLORIDE 25 MG: 25 CAPSULE ORAL at 12:35

## 2021-04-17 NOTE — BH NOTES
During this period (3pm-11pm) pt has been compliant with unit rules, medication regimen and direction from staff. Pt socializes and interacts appropriately with peers and members of staff in the milieu. Pt tends to be reserved to self but open to conversation upon approach. Pt has participated in community group held by staff this period. When asked about her mood & well being pt stated, \"I'm doing better day by day speaking to my family helps and reading helps take my mind off of things. \" Pt has been pleasant and cooperative and has not required redirection from staff. Pt is able to contract for safety and denies thoughts of SI at this time. Staff will continue rounds monitoring pt for changes in behavior, location & safety. Addendum: Pt notified staff the food she has been eating has upset her GI tract made MHTs and RNs aware. Staff encouraged pt to inform MD and nutritionist about food(s) causing upset stomach. Pt may need special diet in order to nourish self properly during hospital stay.

## 2021-04-17 NOTE — BH NOTES
Pt approaching nursing station stating, \"The lunch upset my stomach. If I eat raw vegetables, it usually makes me have some type of food intolerance, like an allergy or something. Some foods make my throat swell, but most just give me some type of GI problems. I had broccoli, potatoes, grapes, and chicken for lunch so I'm not sure which did it. I'm usually vegetarian anyway. \" MD notified, and orders for vegetarian diet were received. Pt received PRN Benadryl. Will continue to monitor for effectiveness.

## 2021-04-17 NOTE — PROGRESS NOTES
Ms. Marla Bañuelos was discussed with nursing staff, her chart was reviewed and she was seen during rounds. Per nursing staff, she has been compliant with medications with no acute events overnight. During rounds, the patient states, \"Yesterday, I was doing a lot better. \" Upon further questioning, she reports difficulty initiating sleep. She reports elevated mood after mood after having phone call conversations with her parents and boyfriend. She denies nightmares. She does report anxiety while trying to initiate sleep. She reports tolerating her medications without difficulty. Vitals:  97.8   66   122/78   12     MSE-25yo female. Grooming is appropriate. Cooperative. Appears stated age. No abnormal movements. Stated mood is, \"I feel hopeful. \" Affect is full. Thoughts are logical and goal-directed. Denies SI and HI. Denies psychosis and lilia. Does not appear to respond to internal stimuli. Insight and judgment appear fair. A/P-Bipolar 1 disorder, mre depressed, severe; Cannabis use disorder, severe  1. Bipolar disorder-continue Lamictal 50mg bid and Wellbutrin XL 300mg qam.  2. Hypothyroidism-Continue Thyroid hormone. 3. SLE-Continue Plaquenil 200mg daily. 4. Continue hospitalization.

## 2021-04-17 NOTE — BH NOTES
Patient has been in the day area with peer coloring and conversant. Her mood appears upbeat. She stated that she is do good. She denies any pain at this time. Patient has been compliant with her medications and meals. Patient is monitored for safety and therapeutic support.

## 2021-04-18 PROCEDURE — 74011250637 HC RX REV CODE- 250/637: Performed by: PSYCHIATRY & NEUROLOGY

## 2021-04-18 PROCEDURE — 65220000003 HC RM SEMIPRIVATE PSYCH

## 2021-04-18 PROCEDURE — 99231 SBSQ HOSP IP/OBS SF/LOW 25: CPT | Performed by: PSYCHIATRY & NEUROLOGY

## 2021-04-18 RX ADMIN — LEVOTHYROXINE, LIOTHYRONINE 120 MG: 19; 4.5 TABLET ORAL at 09:15

## 2021-04-18 RX ADMIN — LAMOTRIGINE 50 MG: 25 TABLET ORAL at 20:09

## 2021-04-18 RX ADMIN — LAMOTRIGINE 50 MG: 25 TABLET ORAL at 09:14

## 2021-04-18 RX ADMIN — BUPROPION HYDROCHLORIDE 300 MG: 300 TABLET, FILM COATED, EXTENDED RELEASE ORAL at 07:00

## 2021-04-18 RX ADMIN — DIPHENHYDRAMINE HYDROCHLORIDE 25 MG: 25 CAPSULE ORAL at 13:38

## 2021-04-18 RX ADMIN — HYDROXYCHLOROQUINE SULFATE 200 MG: 200 TABLET ORAL at 09:14

## 2021-04-18 RX ADMIN — ALPRAZOLAM 0.5 MG: 0.25 TABLET ORAL at 20:10

## 2021-04-18 NOTE — PROGRESS NOTES
Problem: Falls - Risk of  Goal: *Absence of Falls  Description: Document Yolanda Truong Fall Risk and appropriate interventions in the flowsheet. Outcome: Progressing Towards Goal  Note: Fall Risk Interventions:            Medication Interventions: Teach patient to arise slowly                   Problem: Suicide  Goal: *STG: Remains safe in hospital  Description: Pt will verbally contract for safety daily while hospitalized. Outcome: Progressing Towards Goal  Goal: *STG: Attends activities and groups  Description: Patient will attend at least 2 groups daily. Outcome: Progressing Towards Goal     Pt presents with dull affect, depressed mood, anxious at times. Pt has been sociable with her peers on the unit. Pt states, \"I need to get a note for work or call them tomorrow to let them know I'm in the hospital.\" Pt is participative in groups and is adherent with unit guidelines. Pt denies SI/HI at this time. Pt is medication compliant. Will continue to monitor.

## 2021-04-18 NOTE — PROGRESS NOTES
Ms. Lezlie Dakin was discussed with nursing staff, her chart was reviewed and she was seen during rounds. Per nursing staff, the patient c/o food sensitivity. During rounds, the patient reports taking her medications as prescribed with good efficacy. She reports having slept well. She reports concern about missing work tomorrow. The patient was assisted with finding contact information for her employer. Vitals:  97   104   126/86   21     MSE-27yo female. Grooming is appropriate. Cooperative. Appears stated age. No abnormal movements. Stated mood is, \"I just woke up so I'm grumpy. Other than that, I'm okay. \" Affect is full. Thoughts are logical and goal-directed. Denies SI and HI. Denies psychosis and lilia. Does not appear to respond to internal stimuli. Insight and judgment appear fair. A/P-Bipolar 1 disorder, mre depressed, severe; Cannabis use disorder, severe  1. Bipolar disorder-Continue Lamictal 50mg bid and Wellbutrin XL 300mg qam.  2. Hypothyroidism-Continue Thyroid hormone. 3. SLE-Continue Plaquenil 200mg daily. 4. Continue hospitalization. Patient stabilizing.

## 2021-04-18 NOTE — GROUP NOTE
DAVE  GROUP DOCUMENTATION INDIVIDUAL Group Therapy Note Date: 4/17/2021 Group Start Time: 1 Group End Time: 2000 Group Topic: Nursing [facility] 1 ADULT CHEM DEP Lorene Gallego RN 
 
[facility] GROUP DOCUMENTATION GROUP Group Therapy Note Attendees: 8 Attendance: Attended Interventions/techniques: Art integration and Validated Follows Directions: Followed directions Interactions: Interacted appropriately Mental Status: Calm Behavior/appearance: Cooperative Goals Achieved: Identified feelings Grisel Cormier RN

## 2021-04-19 VITALS
WEIGHT: 115 LBS | HEIGHT: 61 IN | HEART RATE: 90 BPM | SYSTOLIC BLOOD PRESSURE: 112 MMHG | TEMPERATURE: 97 F | RESPIRATION RATE: 18 BRPM | DIASTOLIC BLOOD PRESSURE: 68 MMHG | BODY MASS INDEX: 21.71 KG/M2 | OXYGEN SATURATION: 99 %

## 2021-04-19 PROCEDURE — 74011250637 HC RX REV CODE- 250/637: Performed by: PSYCHIATRY & NEUROLOGY

## 2021-04-19 PROCEDURE — 99239 HOSP IP/OBS DSCHRG MGMT >30: CPT | Performed by: PSYCHIATRY & NEUROLOGY

## 2021-04-19 RX ORDER — LAMOTRIGINE 100 MG/1
100 TABLET ORAL DAILY
Qty: 30 TAB | Refills: 0 | Status: SHIPPED | OUTPATIENT
Start: 2021-04-19

## 2021-04-19 RX ADMIN — HYDROXYCHLOROQUINE SULFATE 200 MG: 200 TABLET ORAL at 10:12

## 2021-04-19 RX ADMIN — BUPROPION HYDROCHLORIDE 300 MG: 300 TABLET, FILM COATED, EXTENDED RELEASE ORAL at 06:12

## 2021-04-19 RX ADMIN — LEVOTHYROXINE, LIOTHYRONINE 120 MG: 19; 4.5 TABLET ORAL at 10:12

## 2021-04-19 RX ADMIN — LAMOTRIGINE 50 MG: 25 TABLET ORAL at 10:12

## 2021-04-19 NOTE — BH NOTES
Pt.is visible in the milieu. She interacts well with her peers and staff. She is compliant with her medications and attended groups She denies Si,HI or AVH. Will continue to monitor for safety and provide support as needed.

## 2021-04-19 NOTE — GROUP NOTE
DAVE  GROUP DOCUMENTATION INDIVIDUAL Group Therapy Note Date: 4/18/2021 Group Start Time: 2000 Group End Time: 2030 Group Topic: Nursing [facility] 1 ADULT CHEM DEP Heriberto Rothman RN 
 
[facility] GROUP DOCUMENTATION GROUP Group Therapy Note Attendees: 10 Attendance: Attended Patient's Goal:  Things make you proud. Interventions/techniques: Art integration, Informed and Validated Follows Directions: Followed directions Interactions: Interacted appropriately Mental Status: Calm Behavior/appearance: Cooperative Goals Achieved: Able to engage in interactions, Able to listen to others and Identified feelings Dylan So RN

## 2021-04-19 NOTE — BSMART NOTE
Pt. is a 76-year-old female with history of Bipolar Disorder. Pt. was admitted to this facility for increase ideations to harm self with a plan to overdose on medications. Pt.s case was discussed. Pt. will be dc today. SW met with pt to discuss dc plan. \" I feel better\". SW encouraged continued medication compliance, discussed safety plan and aftercare. Pt denies ideations an hallucinations. Pt plans to return to her home with parents. Pt. Has a therapy appointment with Kim Liang on 5/13/21 @ 12:30  and a medication appointment with Anita Tripathi on 5/27/21 @ 1:45 @   [address] [facility]  . Pt.'s family is picking her up at [place].   
 
 
Verner Butts MA, LMHP-R

## 2021-04-19 NOTE — SUICIDE SAFETY PLAN
SAFETY PLAN    A suicide Safety Plan is a document that supports someone when they are having thoughts of suicide. Warning Signs that indicate a suicidal crisis may be developing: What (situations, thoughts, feelings, body sensations, behaviors, etc.) do you experience that lets you know you are beginning to think about suicide? 1. Sobbing Uncontrollably  2. Saying \"I don't know\" to everything  3. Sleeping all day    Internal Coping Strategies:  What things can I do (relaxation techniques, hobbies, physical activities, etc.) to take my mind off my problems without contacting another person? 1. Yoga Breathing  2. Making Art  3. Reading &/or Listening to music    People and social settings that provide distraction: Who can I call or where can I go to distract me? 1. Name: Davonte Canada   Phone: In cell phone  2. Name: Mom    Phone: Live with & number in phone   3. Place: Park            4. Place: [place]  5. Shakira's house    People whom I can ask for help: Who can I call when I need help - for example, friends, family, clergy, someone else? 1. Name: Mom/DAd                  Phone: Live with & number in cell phone  2. Name: Sister   Phone: Number in cell phone  3. Name: Davonte [place]   Phone: Number in cell phone    Professionals or [facility] I can contact during a crisis: Who can I call for help - for example, my doctor, my psychiatrist, my psychologist, a mental health provider, a suicide hotline? 1. Clinician Name: Bella Leon NP    Phone: [phone]    2. Clinician Name: Ella Hanson    Phone: [phone]    3. Suicide Prevention Lifeline: 3-401-223-TALK (2067)    4. [address] or    [address]        24 hours/7 days a week       [phone]         Making the environment safe: How can I make my environment (house/apartment/living space) safer?  For example, can I remove guns, medications, and other items? 1. Set environment up for success: remove clutter & organize   2.  Remove paraphernalia

## 2021-04-19 NOTE — BH NOTES
Patient is alert x3 and ambulatory. Patient has copy of discharge paperwork with follow up appointments. Patient denies thoughts of self harm or harm to others at this time. Patient has prescription to be filled at pharmacy of choice. Patient has all personal belongings to include home medications brought to facility upon admission. Patient was given  return to work form dated 04/21/2021. Patient armband taken and shredded prior to leaving unit. Patient completed safety plan prior to discharge and plan included with discharge paperwork. Patient discharged to home address with father providing transportation. Patient was escorted to main entrance by MHT once father had arrived to  patient.

## 2021-04-19 NOTE — DISCHARGE INSTRUCTIONS
***IMPORTANT NUMBERS***        [address] [facility]        [phone]    [facility] \[facility]\""       [phone]    Suicide Prevention     [phone]          Patient is alert x3 and ambulatory. Patient has copy of discharge papers with follow up appt. Patient has prescriptions to be filled at pharmacy of choice. Patient has form to return to work dated 04/21/2021. Patient has all personal belongings and has signed form. Patient denies thoughts of self harm or harm to others at this time. Patient armband taken and shredded. Patient discharged to home address with parents providing transportation. Patient completed safety plan prior to discharge and plan included with discharge paperwork.

## 2021-04-19 NOTE — DISCHARGE SUMMARY
[facility] [place]  Inpatient Psychiatry   Discharge Summary     Admit date: 4/14/2021    Discharge date and time: 4/19/2021    Discharge Physician: Brenda Adkins MD    DISCHARGE DIAGNOSES     Psychiatric Diagnoses:   Patient Active Problem List   Diagnosis Code    Tachycardia R00.0    Bipolar 1 disorder, depressed, severe ([facility] F31.4    Cannabis use disorder, severe, dependence ([address] F12.20    Lupus (systemic lupus erythematosus) ([facility] M32.9    Acquired hypothyroidism E03.9       Level of impairment/disability:  [address] is a 31 y.o. WHITE female with a history of major depressive disorder, SLE, hypothyroidism, GERD who was admitted voluntarily for suicidal ideation with a plan to overdose on all her medications. The patient is a fair historian. The patient reports that she has had intermittent depressive episodes since 2009. She felt that her mood was stable on a combination of Wellbutrin and Lexapro until sometime last year when her psychiatrist retired. She says she was set up with a new psychiatrist who did not change her medications immediately but her mood started worsening gradually. Her mood has been especially worse this year and she has had severe mood swings and suicidal ideations for the past month so her current psychiatrist decided to discontinue Lexapro and start Lamictal because she was thinking the patient may have bipolar disorder or borderline personality disorder. The patient is still depressed, she feels that her medications are not working and she is very anxious. Stressors are mainly related to work situation, health problems and family problems. 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 She has been quite overwhelmed with the recent change.   She also says that dealing with the restrictions as a result of the COVID-19 pandemic has been stressful for her. She is worried about her multiple medical problems and says she is chronically tired and not sure usually what is causing the tiredness, whether it is due to lupus or hypothyroidism. She also worries about her mother who has health problems. She says her family does not really express their feelings and she gets very anxious talking to her parents about her mental illness because she does not want them to be worried about her. She mentions that her mother's uncle  by suicide and it brought great turmoil to the family so she is not comfortable sharing when she has suicidal thoughts.     Neurovegetative symptoms of depression include insomnia. The patient says she has had nightmares since childhood and she has been dealing with insomnia for a long time. She has to take Xanax in order to be able to go to sleep. She is afraid to go to sleep because of the nightmares. She says she was prescribed prazosin in the past which was not helpful. She also reports decrease in appetite, fatigue, inability to concentrate, anhedonia, feelings of hopelessness and helplessness. She endorses manic symptoms in the past associated with excessive talkativeness, racing thoughts, mood swings and impulsivity with excessive spending. She says she first noticed a manic episode when she was prescribed Cymbalta but that she has had other manic episodes since then.     The patient admits to smoking marijuana daily since 2018. She says she drinks alcohol occasionally, about once a month. There was a time when she and her friends were drinking once a week but that only lasted for about a month. Her UDS was positive for benzos and THC. She denies use of opiates, cocaine or other recreational drugs.     Hospital course: The patient was admitted and monitored for suicidal ideation and depressed mood. She received individual, group and medication therapy.   Based on evaluation, she has Bipolar Disorder. She stated that her outpatient provider was in the process of increasing her dose of Lamictal from 50 mg to 100 mg daily so this was done during this hospitalization. She was continued on Wellbutrin  mg daily. The patient demonstrated improvement in her mood and anxiety during hospital course. She actively engaged in treatment plan and attended groups with good participation. She did not engage in any self-injurious behaviors. She did not have any behavioral problems such as agitation or irritability or anything requiring extra as needed medications. Her mood improved and her anxiety also significantly decreased. Supportive therapy was provided to help her come up with different coping skills she could use to help manage stress and anxiety at home. The patient is future oriented at time of discharge. She denies suicidal ideation, homicidal ideation or psychotic symptoms. She did not present with any psychotic or manic symptoms during this hospitalization. She has been thinking of ways to supplement her income and also considering alternative carreers which may give her more control of her time. She has used the time here in the hospital to come up with different plans for the future. The patient was encouraged to adhere to medication regimen and outpatient follow-up treatment. DISPOSITION/FOLLOW-UP     Disposition: Home    Follow-up Appointments: Follow-up Information     Follow up With Specialties Details Why Contact Info    Chavo Max MD Internal Medicine   [address] [place] [address]. On 5/13/2021 12:30pm appointment with Malgorzata Samano for continuation of therapy. [address]. On 5/27/2021 1:45pm appointment with Aquiles Craven NP for medication management.  [facility] [address] [facility]  [phone]            MEDICATION CHANGES   Outpatient medications:  No current facility-administered medications on file prior to encounter. Current Outpatient Medications on File Prior to Encounter   Medication Sig Dispense Refill    ALPRAZolam (Xanax) 0.5 mg tablet Take 0.5 mg by mouth nightly as needed for Anxiety (qhs prn and x 1 daily prn). Indications: anxiousness associated with depression      buPROPion XL (WELLBUTRIN XL) 300 mg XL tablet Take 300 mg by mouth every morning.  hydroxychloroquine (PLAQUENIL) 200 mg tablet Take 1 Tab by mouth.  0    doxycycline (VIBRAMYCIN) 100 mg capsule Take 1 Cap by mouth two (2) times a day. 14 Cap 0    thyroid, Pork, (ARMOUR THYROID) 90 mg tablet Take 90 mg by mouth daily.  escitalopram oxalate (LEXAPRO) 20 mg tablet       predniSONE (DELTASONE) 5 mg tablet Take  by mouth.  ranitidine (ZANTAC) 300 mg tablet Take 1 Tab by mouth as needed. Indications: Heartburn  1         Discharged medication:  Current Discharge Medication List      CONTINUE these medications which have CHANGED    Details   lamoTRIgine (LaMICtaL) 100 mg tablet Take 1 Tab by mouth daily. Indications: bipolar depression  Qty: 30 Tab, Refills: 0         CONTINUE these medications which have NOT CHANGED    Details   ALPRAZolam (Xanax) 0.5 mg tablet Take 0.5 mg by mouth nightly as needed for Anxiety (qhs prn and x 1 daily prn). Indications: anxiousness associated with depression      buPROPion XL (WELLBUTRIN XL) 300 mg XL tablet Take 300 mg by mouth every morning. hydroxychloroquine (PLAQUENIL) 200 mg tablet Take 1 Tab by mouth. Refills: 0      thyroid, Pork, (ARMOUR THYROID) 90 mg tablet Take 90 mg by mouth daily.          STOP taking these medications       doxycycline (VIBRAMYCIN) 100 mg capsule Comments:   Reason for Stopping:         escitalopram oxalate (LEXAPRO) 20 mg tablet Comments:   Reason for Stopping:         predniSONE (DELTASONE) 5 mg tablet Comments:   Reason for Stopping:         ranitidine (ZANTAC) 300 mg tablet Comments:   Reason for Stopping:               Instructions, risks (black box warning), benefits and side effects (EPS, TD, NMS) were discussed in detail prior to discharge. Patient denied any adverse medication side effects prior to discharge. LABS/IMAGING DURING ADMISSION     Results for orders placed or performed during the hospital encounter of 04/14/21   COVID-19 RAPID TEST   Result Value Ref Range    Specimen source Nasopharyngeal      COVID-19 rapid test Not detected NOTD     DRUG SCREEN, URINE   Result Value Ref Range    BENZODIAZEPINES Positive (A) NEG      BARBITURATES Negative NEG      THC (TH-CANNABINOL) Positive (A) NEG      OPIATES Negative NEG      PCP(PHENCYCLIDINE) Negative NEG      COCAINE Negative NEG      AMPHETAMINES Negative NEG      METHADONE Negative NEG      HDSCOM (NOTE)    ETHYL ALCOHOL   Result Value Ref Range    ALCOHOL(ETHYL),SERUM <3 0 - 3 MG/DL   CBC WITH AUTOMATED DIFF   Result Value Ref Range    WBC 10.6 4.6 - 13.2 K/uL    RBC 4.59 4.20 - 5.30 M/uL    HGB 14.6 12.0 - 16.0 g/dL    HCT 43.4 35.0 - 45.0 %    MCV 94.6 74.0 - 97.0 FL    MCH 31.8 24.0 - 34.0 PG    MCHC 33.6 31.0 - 37.0 g/dL    RDW 12.1 11.6 - 14.5 %    PLATELET 038 403 - 136 K/uL    MPV 9.8 9.2 - 11.8 FL    NEUTROPHILS 74 (H) 40 - 73 %    LYMPHOCYTES 18 (L) 21 - 52 %    MONOCYTES 6 3 - 10 %    EOSINOPHILS 1 0 - 5 %    BASOPHILS 1 0 - 2 %    ABS. NEUTROPHILS 7.8 1.8 - 8.0 K/UL    ABS. LYMPHOCYTES 1.9 0.9 - 3.6 K/UL    ABS. MONOCYTES 0.6 0.05 - 1.2 K/UL    ABS. EOSINOPHILS 0.1 0.0 - 0.4 K/UL    ABS.  BASOPHILS 0.1 0.0 - 0.1 K/UL    DF AUTOMATED     METABOLIC PANEL, BASIC   Result Value Ref Range    Sodium 136 136 - 145 mmol/L    Potassium 3.7 3.5 - 5.5 mmol/L    Chloride 105 100 - 111 mmol/L    CO2 25 21 - 32 mmol/L    Anion gap 6 3.0 - 18 mmol/L    Glucose 84 74 - 99 mg/dL    BUN 10 7.0 - 18 MG/DL    Creatinine 0.72 0.6 - 1.3 MG/DL BUN/Creatinine ratio 14 12 - 20      GFR est AA >60 >60 ml/min/1.73m2    GFR est non-AA >60 >60 ml/min/1.73m2    Calcium 9.8 8.5 - 10.1 MG/DL   HCG QL SERUM   Result Value Ref Range    HCG, Ql. Negative NEG          DISCHARGE MENTAL STATUS EVALUATION     Appearance/Hygiene 31 y.o. WHITE female  Hygiene: Good   Attitude/Behavior/Social Relatedness Appropriate   Musculoskeletal Gait/Station: appropriate  Tone (flaccid, cogwheeling, spastic): not assessed  Psychomotor (hyperkinetic, hypokinetic): calm  Involuntary movements (tics, dyskinesias, akathisa, stereotypies): none   Speech   Rate, rhythm, volume, fluency and articulation are appropriate   Mood   euthymic   Affect    congruent   Thought Process Linear and goal directed   Thought Content and Perceptual Disturbances Denies self-injurious behavior (SIB), suicidal ideation (SI), aggressive behavior or homicidal ideation (HI)    Denies auditory and visual hallucinations   Sensorium and Cognition  Grossly intact   Insight  good   Judgement good       SUICIDE RISK ASSESSMENT     [] Admission  [x] Discharge     Key Factors:   Current admission precipitated by suicide attempt?   []  Yes     2    [x]  No     1     Suicide Attempt History  [] Past attempts of high lethality    2 []  Past attempts of low lethality    1 [x]  No previous attempts       0   Suicidal Ideation []  Constant suicidal thoughts      2 []  Intermittent or fleeting suicidal  thoughts  1 [x]  Denies current suicidal thoughts    0   Suicide Plan   []  Has plan with actual OR potential access to planned method    2 []  Has plan without access to planned method      1 [x]  No plan            0   Plan Lethality []  Highly lethal plan (Carbon monoxide, gun, hanging, jumping)    2 []  Moderate lethality of plan          1 []  Low lethality of plan (biting, head banging, superficial scratching, pillow over face)  0   Safety Plan Agreement  []  Unwilling OR unable to agree due to impaired reality testing   2 []  Patient is ambivalent and/or guarded      1 [x]  Reliably agrees        0   Current Morbid Thoughts (reunion fantasies, preoccupations with death) []  Constantly     2     []  Frequently    1 [x]  Rarely    0   Elopement Risk  []  High risk     2 []  Moderate risk    1 [x]   Low risk    0   Symptoms    []  Hopeless  []  Helpless  []  Anhedonia   []  Guilt/shame  []  Anger/rage  [x]  Anxiety  []  Insomnia   []  Agitation   []  Impulsivity  []  5-6 symptoms present    2 []  3-4 symptoms present    1  [x]  0-2 symptoms present    0     Scoring Key:  10 or higher = Imminent Risk (consider 1:1)  4 - 9 = Moderate Risk (consider q 15 minute observation)Attended alcohol, tobacco, prescription and other drug psychoeducation group.   0 - 3 = Low Risk (consider q 30 minute observation)    Total Score: 1  ------------------------------------------------------------------------------------------------------------------  PLEASE ADDRESS THE FOLLOWING 5 ISSUES     Physician's Subjective Appraisal of Risk (check one):  []  Patient replies not trustworthy: several non-verbal cues. []  Patient replies questionable: trustworthy: at least 1 non-verbal cue. [x]  Patient replies appear trustworthy. Family History of Suicide?    []  Yes  [x]  No    Protective measures (select all that apply):  [x]  Successful past responses to stress  []  Spiritual/Restorationist beliefs  [x]  Capacity for reality testing  [x]  Positive therapeutic relationships  [x]  Social supports/connections  [x]  Positive coping skills  []  Frustration tolerance/optimism  []  Children or pets in the home  []  Sense of responsibility to family  [x]  Agrees to treatment plan and follow up    Others (list):    High Risk Diagnoses (select all that apply):  [x]  Depression/Bipolar Disorder  []  Dual Diagnosis  []  Cardiovascular Disease  []  Schizophrenia  []  Chronic Pain  []  Epilepsy  []  Cancer  []  Personality Disorder  []  HIV/AIDS  []  Multiple Sclerosis    Dangerousness Assessment (Suicide, homicide, property destruction. ..)    Risk Factors reviewed and risk assessed to be:  [x] low  [] low-moderate  [] moderate   [] moderate-high  [] high     Protection factors reviewed and risk assessed to be:  [x] low  [] low-moderate  [] moderate   [] moderate-high  [] high     Response to treatment and risk assessed to be:  [x] low  [] low-moderate  [] moderate   [] moderate-high  [] high     Support reviewed and risk assessed to be:  [x] low  [] low-moderate  [] moderate   [] moderate-high  [] high     Acceptance of Discharge and outpatient treatment reviewed and risk assessed to be:    [x] low  [] low-moderate  [] moderate   [] moderate-high  [] high   Overall risk assessed to be:  [x] low  [] low-moderate  [] moderate   [] moderate-high  [] high     Completion of discharge was greater than 30 minutes. Over 50% of today's discharge was geared towards counseling and coordination of care.           Kathy Trent MD  Psychiatry  [facility]

## 2021-04-20 ENCOUNTER — TELEPHONE (OUTPATIENT)
Dept: ADDICTION MEDICINE | Age: 26
End: 2021-04-20

## 2021-04-20 NOTE — TELEPHONE ENCOUNTER
This writer completed follow-up call at [phone] to phone number of [phone]. Patient states that she is doing fine. Confirms plans to follow-up with [address] [place] [address].

## 2021-05-05 ENCOUNTER — HOSPITAL ENCOUNTER (OUTPATIENT)
Dept: GENERAL RADIOLOGY | Age: 26
Discharge: HOME | End: 2021-05-05
Payer: COMMERCIAL

## 2021-05-05 DIAGNOSIS — R06.09 DYSPNEA ON EXERTION: ICD-10-CM

## 2021-05-05 PROCEDURE — 71046 X-RAY EXAM CHEST 2 VIEWS: CPT

## 2021-07-09 ENCOUNTER — TRANSCRIBE ORDER (OUTPATIENT)
Dept: SCHEDULING | Age: 26
End: 2021-07-09

## 2021-07-09 DIAGNOSIS — R06.02 SOB (SHORTNESS OF BREATH): ICD-10-CM

## 2021-07-09 DIAGNOSIS — R06.02 SOB (SHORTNESS OF BREATH): Primary | ICD-10-CM

## 2021-07-09 DIAGNOSIS — M35.89 OTHER SPECIFIED SYSTEMIC INVOLVEMENT OF CONNECTIVE TISSUE (HCC): Primary | ICD-10-CM

## 2021-07-09 DIAGNOSIS — M35.89 OTHER SPECIFIED SYSTEMIC INVOLVEMENT OF CONNECTIVE TISSUE (HCC): ICD-10-CM

## 2021-07-22 ENCOUNTER — HOSPITAL ENCOUNTER (OUTPATIENT)
Dept: NON INVASIVE DIAGNOSTICS | Age: 26
Discharge: HOME | End: 2021-07-22
Attending: INTERNAL MEDICINE
Payer: COMMERCIAL

## 2021-07-22 VITALS
WEIGHT: 115 LBS | SYSTOLIC BLOOD PRESSURE: 112 MMHG | BODY MASS INDEX: 21.71 KG/M2 | HEIGHT: 61 IN | DIASTOLIC BLOOD PRESSURE: 68 MMHG

## 2021-07-22 DIAGNOSIS — M35.89 OTHER SPECIFIED SYSTEMIC INVOLVEMENT OF CONNECTIVE TISSUE (HCC): ICD-10-CM

## 2021-07-22 DIAGNOSIS — R06.02 SOB (SHORTNESS OF BREATH): ICD-10-CM

## 2021-07-22 LAB
ECHO AO ROOT DIAM: 2.74 CM
ECHO LV E' LATERAL VELOCITY: 17.3 CM/S
ECHO LV INTERNAL DIMENSION DIASTOLIC: 3.81 CM (ref 3.9–5.3)
ECHO LV INTERNAL DIMENSION SYSTOLIC: 2.16 CM
ECHO LV IVSD: 0.61 CM (ref 0.6–0.9)
ECHO LV MASS 2D: 65.6 G (ref 67–162)
ECHO LV MASS INDEX 2D: 44 G/M2 (ref 43–95)
ECHO LV POSTERIOR WALL DIASTOLIC: 0.69 CM (ref 0.6–0.9)
ECHO LVOT CARDIAC OUTPUT: 4.15 LITER/MINUTE
ECHO LVOT DIAM: 1.93 CM
ECHO LVOT PEAK GRADIENT: 2.96 MMHG
ECHO LVOT PEAK VELOCITY: 86.01 CM/S
ECHO LVOT SV: 48.7 ML
ECHO LVOT VTI: 16.66 CM
ECHO MV A VELOCITY: 61.41 CM/S
ECHO MV E DECELERATION TIME (DT): 263.14 MS
ECHO MV E VELOCITY: 105.05 CM/S
ECHO MV E/A RATIO: 1.71
ECHO MV E/E' LATERAL: 6.07
ECHO RA AREA 4C: 7.98 CM2
ECHO RV TAPSE: 2.27 CM (ref 1.5–2)
LVOT MG: 1.68 MMHG

## 2021-07-22 PROCEDURE — 93306 TTE W/DOPPLER COMPLETE: CPT

## 2021-07-30 ENCOUNTER — HOSPITAL ENCOUNTER (OUTPATIENT)
Dept: RESPIRATORY THERAPY | Age: 26
Discharge: HOME | End: 2021-07-30
Attending: INTERNAL MEDICINE
Payer: COMMERCIAL

## 2021-07-30 DIAGNOSIS — R06.02 SOB (SHORTNESS OF BREATH): ICD-10-CM

## 2021-07-30 DIAGNOSIS — M35.89 OTHER SPECIFIED SYSTEMIC INVOLVEMENT OF CONNECTIVE TISSUE (HCC): ICD-10-CM

## 2021-07-30 PROCEDURE — 94726 PLETHYSMOGRAPHY LUNG VOLUMES: CPT

## 2021-07-30 PROCEDURE — 94060 EVALUATION OF WHEEZING: CPT

## 2021-07-30 PROCEDURE — 94729 DIFFUSING CAPACITY: CPT

## 2022-03-18 PROBLEM — M32.9 LUPUS (SYSTEMIC LUPUS ERYTHEMATOSUS) (HCC): Status: ACTIVE | Noted: 2021-04-15

## 2022-03-18 PROBLEM — F31.4 BIPOLAR 1 DISORDER, DEPRESSED, SEVERE (HCC): Status: ACTIVE | Noted: 2021-04-14

## 2022-03-19 PROBLEM — E03.9 ACQUIRED HYPOTHYROIDISM: Status: ACTIVE | Noted: 2021-04-15

## 2022-03-20 PROBLEM — F12.20 CANNABIS USE DISORDER, SEVERE, DEPENDENCE (HCC): Status: ACTIVE | Noted: 2021-04-15

## 2023-05-12 RX ORDER — ALPRAZOLAM 0.5 MG/1
TABLET ORAL
COMMUNITY

## 2023-05-12 RX ORDER — LAMOTRIGINE 100 MG/1
TABLET ORAL DAILY
COMMUNITY
Start: 2021-04-19

## 2023-05-12 RX ORDER — HYDROXYCHLOROQUINE SULFATE 200 MG/1
1 TABLET, FILM COATED ORAL
COMMUNITY
Start: 2016-02-10

## 2023-05-12 RX ORDER — BUPROPION HYDROCHLORIDE 300 MG/1
300 TABLET ORAL EVERY MORNING
COMMUNITY

## 2023-05-12 RX ORDER — LEVOTHYROXINE AND LIOTHYRONINE 57; 13.5 UG/1; UG/1
90 TABLET ORAL DAILY
COMMUNITY

## 2024-02-09 ENCOUNTER — HOSPITAL ENCOUNTER (OUTPATIENT)
Facility: HOSPITAL | Age: 29
Discharge: HOME | End: 2024-02-09
Payer: COMMERCIAL

## 2024-02-09 DIAGNOSIS — M54.2 NECK PAIN: ICD-10-CM

## 2024-02-09 PROCEDURE — 72040 X-RAY EXAM NECK SPINE 2-3 VW: CPT

## 2024-12-12 ENCOUNTER — HOSPITAL ENCOUNTER (OUTPATIENT)
Facility: HOSPITAL | Age: 29
Discharge: HOME | End: 2024-12-15
Payer: COMMERCIAL

## 2024-12-12 VITALS — BODY MASS INDEX: 18.74 KG/M2 | WEIGHT: 99.2 LBS

## 2024-12-12 DIAGNOSIS — R06.02 SHORTNESS OF BREATH: ICD-10-CM

## 2024-12-12 PROCEDURE — 94726 PLETHYSMOGRAPHY LUNG VOLUMES: CPT

## 2024-12-12 PROCEDURE — 94729 DIFFUSING CAPACITY: CPT

## 2024-12-12 PROCEDURE — 94060 EVALUATION OF WHEEZING: CPT

## 2024-12-16 LAB
DLCO %PRED: NORMAL
DLCO PRED: NORMAL
DLCO/VA %PRED: NORMAL
DLCO/VA PRED: NORMAL
DLCO/VA: NORMAL
DLCO: NORMAL
EXPIRATORY TIME-POST: NORMAL
EXPIRATORY TIME: NORMAL
FEF 25-75 %CHNG: NORMAL
FEF 25-75 POST %PRED: NORMAL
FEF 25-75% %PRED-PRE: NORMAL
FEF 25-75% PRED: NORMAL
FEF 25-75-POST: NORMAL
FEF 25-75-PRE: NORMAL
FEV1 %PRED-POST: NORMAL
FEV1 %PRED-PRE: NORMAL
FEV1 PRED: NORMAL
FEV1-POST: NORMAL
FEV1-PRE: NORMAL
FEV1/FVC %PRED-POST: NORMAL
FEV1/FVC %PRED-PRE: NORMAL
FEV1/FVC PRED: NORMAL
FEV1/FVC-POST: NORMAL
FEV1/FVC-PRE: NORMAL
FVC %PRED-POST: NORMAL
FVC %PRED-PRE: NORMAL
FVC PRED: NORMAL
FVC-POST: NORMAL
FVC-PRE: NORMAL
GAW %PRED: NORMAL
GAW PRED: NORMAL
GAW: NORMAL
IC PRE %PRED: NORMAL
IC PRED: NORMAL
IC: NORMAL
MEP: NORMAL
MIP: NORMAL
MVV %PRED-PRE: NORMAL
MVV PRED: NORMAL
MVV-PRE: NORMAL
PEF %PRED-POST: NORMAL
PEF %PRED-PRE: NORMAL
PEF PRED: NORMAL
PEF%CHNG: NORMAL
PEF-POST: NORMAL
PEF-PRE: NORMAL
RAW %PRED: NORMAL
RAW PRED: NORMAL
RAW: NORMAL
RV PRE %PRED: NORMAL
RV PRED: NORMAL
RV: NORMAL
SVC %PRED: NORMAL
SVC PRED: NORMAL
SVC: NORMAL
TLC PRE %PRED: NORMAL
TLC PRED: NORMAL
TLC: NORMAL
VA %PRED: NORMAL
VA PRED: NORMAL
VA: NORMAL
VTG %PRED: NORMAL
VTG PRED: NORMAL
VTG: NORMAL

## 2025-01-17 ENCOUNTER — HOSPITAL ENCOUNTER (OUTPATIENT)
Facility: HOSPITAL | Age: 30
Discharge: HOME | End: 2025-01-19
Payer: COMMERCIAL

## 2025-01-17 VITALS
BODY MASS INDEX: 18.69 KG/M2 | HEIGHT: 61 IN | SYSTOLIC BLOOD PRESSURE: 112 MMHG | WEIGHT: 99 LBS | DIASTOLIC BLOOD PRESSURE: 68 MMHG

## 2025-01-17 DIAGNOSIS — R01.1 MURMUR: ICD-10-CM

## 2025-01-17 DIAGNOSIS — R06.02 SHORTNESS OF BREATH: ICD-10-CM

## 2025-01-17 LAB
ECHO AO ASC DIAM: 2.9 CM
ECHO AO ASCENDING AORTA INDEX: 2.07 CM/M2
ECHO AO ROOT DIAM: 3 CM
ECHO AO ROOT INDEX: 2.14 CM/M2
ECHO AV AREA PEAK VELOCITY: 2.8 CM2
ECHO AV AREA VTI: 2.9 CM2
ECHO AV AREA/BSA PEAK VELOCITY: 2 CM2/M2
ECHO AV AREA/BSA VTI: 2.1 CM2/M2
ECHO AV MEAN GRADIENT: 2 MMHG
ECHO AV MEAN VELOCITY: 0.7 M/S
ECHO AV PEAK GRADIENT: 4 MMHG
ECHO AV PEAK VELOCITY: 0.9 M/S
ECHO AV VELOCITY RATIO: 0.89
ECHO AV VTI: 17.9 CM
ECHO BSA: 1.39 M2
ECHO LA VOL A-L A2C: 29 ML (ref 22–52)
ECHO LA VOL A-L A4C: 22 ML (ref 22–52)
ECHO LA VOL MOD A2C: 27 ML (ref 22–52)
ECHO LA VOL MOD A4C: 15 ML (ref 22–52)
ECHO LA VOLUME AREA LENGTH: 30 ML
ECHO LA VOLUME INDEX A-L A2C: 21 ML/M2 (ref 16–34)
ECHO LA VOLUME INDEX A-L A4C: 16 ML/M2 (ref 16–34)
ECHO LA VOLUME INDEX AREA LENGTH: 21 ML/M2 (ref 16–34)
ECHO LA VOLUME INDEX MOD A2C: 19 ML/M2 (ref 16–34)
ECHO LA VOLUME INDEX MOD A4C: 11 ML/M2 (ref 16–34)
ECHO LV E' LATERAL VELOCITY: 15 CM/S
ECHO LV E' SEPTAL VELOCITY: 13 CM/S
ECHO LV EDV A2C: 57 ML
ECHO LV EDV A4C: 42 ML
ECHO LV EDV BP: 53 ML (ref 56–104)
ECHO LV EDV INDEX A4C: 30 ML/M2
ECHO LV EDV INDEX BP: 38 ML/M2
ECHO LV EDV NDEX A2C: 41 ML/M2
ECHO LV EF PHYSICIAN: 60 %
ECHO LV EJECTION FRACTION A2C: 69 %
ECHO LV EJECTION FRACTION A4C: 68 %
ECHO LV EJECTION FRACTION BIPLANE: 71 % (ref 55–100)
ECHO LV ESV A2C: 17 ML
ECHO LV ESV A4C: 13 ML
ECHO LV ESV BP: 15 ML (ref 19–49)
ECHO LV ESV INDEX A2C: 12 ML/M2
ECHO LV ESV INDEX A4C: 9 ML/M2
ECHO LV ESV INDEX BP: 11 ML/M2
ECHO LV FRACTIONAL SHORTENING: 31 % (ref 28–44)
ECHO LV INTERNAL DIMENSION DIASTOLE INDEX: 2.79 CM/M2
ECHO LV INTERNAL DIMENSION DIASTOLIC: 3.9 CM (ref 3.9–5.3)
ECHO LV INTERNAL DIMENSION SYSTOLIC INDEX: 1.93 CM/M2
ECHO LV INTERNAL DIMENSION SYSTOLIC: 2.7 CM
ECHO LV IVSD: 0.6 CM (ref 0.6–0.9)
ECHO LV MASS 2D: 61.6 G (ref 67–162)
ECHO LV MASS INDEX 2D: 44 G/M2 (ref 43–95)
ECHO LV POSTERIOR WALL DIASTOLIC: 0.6 CM (ref 0.6–0.9)
ECHO LV RELATIVE WALL THICKNESS RATIO: 0.31
ECHO LVOT AREA: 3.1 CM2
ECHO LVOT AV VTI INDEX: 0.9
ECHO LVOT DIAM: 2 CM
ECHO LVOT MEAN GRADIENT: 1 MMHG
ECHO LVOT PEAK GRADIENT: 3 MMHG
ECHO LVOT PEAK VELOCITY: 0.8 M/S
ECHO LVOT STROKE VOLUME INDEX: 36.1 ML/M2
ECHO LVOT SV: 50.6 ML
ECHO LVOT VTI: 16.1 CM
ECHO MV A VELOCITY: 0.52 M/S
ECHO MV E DECELERATION TIME (DT): 202.7 MS
ECHO MV E VELOCITY: 0.82 M/S
ECHO MV E/A RATIO: 1.58
ECHO MV E/E' LATERAL: 5.47
ECHO MV E/E' RATIO (AVERAGED): 5.89
ECHO MV E/E' SEPTAL: 6.31
ECHO PV MAX VELOCITY: 0.6 M/S
ECHO PV PEAK GRADIENT: 1 MMHG
ECHO RA AREA 4C: 10 CM2
ECHO RV BASAL DIMENSION: 2.9 CM
ECHO RV FREE WALL PEAK S': 12 CM/S
ECHO RV MID DIMENSION: 2.2 CM
ECHO RV TAPSE: 2 CM (ref 1.7–?)
ECHO TV REGURGITANT MAX VELOCITY: 2.26 M/S
ECHO TV REGURGITANT PEAK GRADIENT: 20 MMHG

## 2025-01-17 PROCEDURE — 93306 TTE W/DOPPLER COMPLETE: CPT | Performed by: INTERNAL MEDICINE

## 2025-01-17 PROCEDURE — 93306 TTE W/DOPPLER COMPLETE: CPT

## 2025-02-28 ENCOUNTER — OFFICE VISIT (OUTPATIENT)
Age: 30
End: 2025-02-28
Payer: COMMERCIAL

## 2025-02-28 VITALS
SYSTOLIC BLOOD PRESSURE: 118 MMHG | BODY MASS INDEX: 19.63 KG/M2 | OXYGEN SATURATION: 99 % | HEIGHT: 61 IN | WEIGHT: 104 LBS | HEART RATE: 104 BPM | DIASTOLIC BLOOD PRESSURE: 62 MMHG

## 2025-02-28 DIAGNOSIS — R01.1 MURMUR: ICD-10-CM

## 2025-02-28 DIAGNOSIS — M32.9 SYSTEMIC LUPUS ERYTHEMATOSUS, UNSPECIFIED SLE TYPE, UNSPECIFIED ORGAN INVOLVEMENT STATUS (HCC): ICD-10-CM

## 2025-02-28 DIAGNOSIS — R00.0 TACHYCARDIA, UNSPECIFIED: ICD-10-CM

## 2025-02-28 DIAGNOSIS — R06.02 SHORTNESS OF BREATH: Primary | ICD-10-CM

## 2025-02-28 PROCEDURE — 99204 OFFICE O/P NEW MOD 45 MIN: CPT | Performed by: INTERNAL MEDICINE

## 2025-02-28 PROCEDURE — 93000 ELECTROCARDIOGRAM COMPLETE: CPT | Performed by: INTERNAL MEDICINE

## 2025-02-28 RX ORDER — SPIRONOLACTONE 50 MG/1
50 TABLET, FILM COATED ORAL NIGHTLY
COMMUNITY
Start: 2025-01-03

## 2025-02-28 RX ORDER — BREXPIPRAZOLE 3 MG/1
TABLET ORAL
COMMUNITY

## 2025-02-28 NOTE — PROGRESS NOTES
HPI:  A 30-year-old female referred for shortness of breath.  She has a longstanding history of lupus, diagnosed about nine years ago, she also has Raynaud's and eosinophilic esophagitis.  These all appear stable.  She had an episode in July and then two in September lasting for 30 minutes with rather abrupt onset shortness of breath, but no obvious chest pain.  She had an echocardiogram and PFT is unremarkable.  No new changes in medications.    Impression:  Episodic abrupt onset shortness of breath up to 30 minutes, occurring once in July and two in September of unclear etiology.  Echocardiogram with structurally normal heart in January 2025.  Pulmonary function test in December 2024, normal.  Longstanding lupus for about nine years.  History of eosinophilic esophagitis, stable.  History of Raynaud's.  Currently, working as a teacher.    Plan:  Her symptoms were rather abrupt onset and termination, and cannot exclude an underlying arrhythmia, although she has no evidence of congestive heart failure and she has a normal left ventricular function.  I am going to have her exercise on a treadmill to evaluate her overall functional capacity and also have her wear an event monitor for two weeks as she may have signs of a more asymptomatic, less sustained tachycardia as a cause.  I will see her back afterwards and I might talk about a home monitor.  There is absolutely no evidence of decompensated heart failure, so I would not pursue proBNP.  If she does have evidence of arrhythmia, it may be reasonable to consider cardiac MRI for completeness.      Wt Readings from Last 3 Encounters:   02/28/25 47.2 kg (104 lb)   01/17/25 44.9 kg (99 lb)   12/12/24 45 kg (99 lb 3.2 oz)     Past Medical History:   Diagnosis Date    Anxiety     nos    Chronic pain     Coagulation disorder     MTHFR mutation - inc clotting    Eosinophilic esophagitis     Fatigue     GERD (gastroesophageal reflux disease)     Hemangioma     removed as child

## 2025-05-28 ENCOUNTER — TELEPHONE (OUTPATIENT)
Age: 30
End: 2025-05-28

## 2025-05-29 ENCOUNTER — OFFICE VISIT (OUTPATIENT)
Age: 30
End: 2025-05-29
Payer: COMMERCIAL

## 2025-05-29 VITALS
BODY MASS INDEX: 19.45 KG/M2 | DIASTOLIC BLOOD PRESSURE: 60 MMHG | OXYGEN SATURATION: 96 % | HEIGHT: 61 IN | WEIGHT: 103 LBS | SYSTOLIC BLOOD PRESSURE: 90 MMHG | HEART RATE: 110 BPM

## 2025-05-29 DIAGNOSIS — R06.00 DYSPNEA, UNSPECIFIED TYPE: Primary | ICD-10-CM

## 2025-05-29 PROCEDURE — 99213 OFFICE O/P EST LOW 20 MIN: CPT | Performed by: INTERNAL MEDICINE

## 2025-05-29 RX ORDER — ESCITALOPRAM OXALATE 10 MG/1
10 TABLET ORAL DAILY
COMMUNITY
Start: 2025-05-14

## 2025-05-29 NOTE — PROGRESS NOTES
HPI: 30-year-old female here for followup. Overall, she is doing well. She has not been having the episodes of dyspnea like she did before. She had a treadmill stress test today, unremarkable, and event monitor without any sustained arrhythmias.     Impression:   1. Episodic onset of dyspnea for about 30 minutes occurring a few times over the past year but now stable.   2. Echocardiogram, structurally normal heart January 2025.  3. Treadmill stress test today without any arrhythmias, reaching target heartrate.   4. Pulmonary function test December 2024, normal.  5. Long-standing lupus for about 9 years.  6. History of eosinophilic esophagitis, stable.  7. History of Raynaud's.   8. Current occupation as teacher.    Plan:  Her treadmill stress test today was unremarkable without any exercise-induced arrhythmias. Her event monitor was unremarkable. I talked about using a home EKG monitor if she has more episodes to make sure it's not arrhythmia-driven. I will otherwise see back as needed. Blood pressure runs low. We talked about adequate hydration and nutrition.         Wt Readings from Last 3 Encounters:   05/29/25 46.7 kg (103 lb)   02/28/25 47.2 kg (104 lb)   01/17/25 44.9 kg (99 lb)     Past Medical History:   Diagnosis Date    Anxiety     nos    Chronic pain     Coagulation disorder     MTHFR mutation - inc clotting    Eosinophilic esophagitis     Fatigue     GERD (gastroesophageal reflux disease)     Hemangioma     removed as child    Hypothyroid     Lupus     Raynaud's disease     Restless leg syndrome     Seizures (HCC)     one event with blood draw    Systemic involvement of connective tissue, unspecified     scleroderma variant       Current Outpatient Medications   Medication Sig Dispense Refill    escitalopram (LEXAPRO) 10 MG tablet Take 1 tablet by mouth daily      REXULTI 3 MG TABS tablet       spironolactone (ALDACTONE) 50 MG tablet Take 1 tablet by mouth nightly at bedtime      buPROPion (WELLBUTRIN